# Patient Record
Sex: FEMALE | Race: WHITE | Employment: FULL TIME | URBAN - METROPOLITAN AREA
[De-identification: names, ages, dates, MRNs, and addresses within clinical notes are randomized per-mention and may not be internally consistent; named-entity substitution may affect disease eponyms.]

---

## 2018-02-07 ENCOUNTER — OFFICE VISIT (OUTPATIENT)
Dept: FAMILY MEDICINE CLINIC | Facility: CLINIC | Age: 28
End: 2018-02-07

## 2018-02-07 VITALS
WEIGHT: 148 LBS | RESPIRATION RATE: 20 BRPM | BODY MASS INDEX: 24.96 KG/M2 | TEMPERATURE: 98 F | DIASTOLIC BLOOD PRESSURE: 82 MMHG | SYSTOLIC BLOOD PRESSURE: 127 MMHG | HEART RATE: 96 BPM | HEIGHT: 64.5 IN

## 2018-02-07 DIAGNOSIS — G89.29 CHRONIC LOW BACK PAIN WITH LEFT-SIDED SCIATICA, UNSPECIFIED BACK PAIN LATERALITY: ICD-10-CM

## 2018-02-07 DIAGNOSIS — M54.42 CHRONIC LOW BACK PAIN WITH LEFT-SIDED SCIATICA, UNSPECIFIED BACK PAIN LATERALITY: ICD-10-CM

## 2018-02-07 DIAGNOSIS — K58.0 IRRITABLE BOWEL SYNDROME WITH DIARRHEA: ICD-10-CM

## 2018-02-07 PROCEDURE — 99202 OFFICE O/P NEW SF 15 MIN: CPT | Performed by: FAMILY MEDICINE

## 2018-02-07 PROCEDURE — 99212 OFFICE O/P EST SF 10 MIN: CPT | Performed by: FAMILY MEDICINE

## 2018-02-07 RX ORDER — CYCLOBENZAPRINE HCL 10 MG
10 TABLET ORAL NIGHTLY
Qty: 30 TABLET | Refills: 1 | Status: SHIPPED | OUTPATIENT
Start: 2018-02-07 | End: 2018-03-29

## 2018-02-07 RX ORDER — GABAPENTIN 100 MG/1
100 CAPSULE ORAL 3 TIMES DAILY
Qty: 90 CAPSULE | Refills: 2 | Status: SHIPPED | OUTPATIENT
Start: 2018-02-07 | End: 2018-04-17

## 2018-02-07 RX ORDER — DICYCLOMINE HCL 20 MG
20 TABLET ORAL
Qty: 30 TABLET | Refills: 2 | Status: SHIPPED | OUTPATIENT
Start: 2018-02-07 | End: 2018-05-02

## 2018-02-07 NOTE — PROGRESS NOTES
HPI:    Patient ID: Chantelle Pryor is a 28year old female. Pt presents to establish care and for some back pain and abdominal issues. Pt has hx of IBS and was on bentyl. Pt was incarcerated recently and just was released recently.  Pt is working as and exercises; Follow up in one week if not resolved. Consider follow up with physiatry if needed. No orders of the defined types were placed in this encounter.       Meds This Visit:  Signed Prescriptions Disp Refills    Dicyclomine HCl 20 MG Oral Tab

## 2018-02-08 ENCOUNTER — APPOINTMENT (OUTPATIENT)
Dept: LAB | Age: 28
End: 2018-02-08
Attending: FAMILY MEDICINE
Payer: MEDICAID

## 2018-02-08 ENCOUNTER — OFFICE VISIT (OUTPATIENT)
Dept: FAMILY MEDICINE CLINIC | Facility: CLINIC | Age: 28
End: 2018-02-08

## 2018-02-08 VITALS
BODY MASS INDEX: 24.96 KG/M2 | TEMPERATURE: 98 F | HEART RATE: 89 BPM | HEIGHT: 64.5 IN | RESPIRATION RATE: 20 BRPM | SYSTOLIC BLOOD PRESSURE: 116 MMHG | WEIGHT: 148 LBS | DIASTOLIC BLOOD PRESSURE: 73 MMHG

## 2018-02-08 DIAGNOSIS — Z11.3 ROUTINE SCREENING FOR STI (SEXUALLY TRANSMITTED INFECTION): ICD-10-CM

## 2018-02-08 DIAGNOSIS — R30.0 BURNING WITH URINATION: Primary | ICD-10-CM

## 2018-02-08 DIAGNOSIS — Z30.9 ENCOUNTER FOR CONTRACEPTIVE MANAGEMENT, UNSPECIFIED TYPE: ICD-10-CM

## 2018-02-08 LAB
BILIRUBIN URINE: NEGATIVE
BLOOD URINE: NEGATIVE
CONTROL RUN WITHIN 24 HOURS?: YES
GLUCOSE URINE: NEGATIVE
KETONE URINE: NEGATIVE
LEUKOCYTE ESTERASE URINE: NEGATIVE
NITRITE URINE: NEGATIVE
PH URINE: 6 (ref 5–8)
PROTEIN URINE: NEGATIVE
SPEC GRAVITY: 1 (ref 1–1.03)
URINE CLARITY: CLEAR
URINE COLOR: COLORLESS
UROBILINOGEN URINE: 0.2

## 2018-02-08 PROCEDURE — 36415 COLL VENOUS BLD VENIPUNCTURE: CPT

## 2018-02-08 PROCEDURE — 80074 ACUTE HEPATITIS PANEL: CPT

## 2018-02-08 PROCEDURE — 99213 OFFICE O/P EST LOW 20 MIN: CPT | Performed by: FAMILY MEDICINE

## 2018-02-08 PROCEDURE — 99212 OFFICE O/P EST SF 10 MIN: CPT | Performed by: FAMILY MEDICINE

## 2018-02-08 RX ORDER — NORGESTIMATE AND ETHINYL ESTRADIOL 7DAYSX3 LO
1 KIT ORAL DAILY
Qty: 1 PACKAGE | Refills: 2 | Status: SHIPPED | OUTPATIENT
Start: 2018-02-08 | End: 2018-05-02

## 2018-02-08 NOTE — PROGRESS NOTES
HPI:    Patient ID: Troy Flores is a 28year old female. Pt has had UTI symptoms for a 1 day. Burning with urination. No fevers or flank pain/ back pains. Pt is also here to discuss contraception options.  Pt was on depo provera previously and w up with gynecology for discussion on alternative contraception and routine exam.    Routine screening for sti (sexually transmitted infection):  - Will check hepatitis panel as discussed.  Follow up and further management after testing        Orders Placed

## 2018-02-09 LAB
HAV IGM SERPL QL IA: NONREACTIVE
HBV CORE IGM SERPL QL IA: NONREACTIVE
HBV SURFACE AG SERPL QL IA: NONREACTIVE
HCV AB SERPL QL IA: NONREACTIVE

## 2018-03-02 ENCOUNTER — HOSPITAL ENCOUNTER (EMERGENCY)
Facility: HOSPITAL | Age: 28
Discharge: HOME | End: 2018-03-02
Payer: MEDICAID

## 2018-03-02 VITALS
HEART RATE: 81 BPM | WEIGHT: 140 LBS | SYSTOLIC BLOOD PRESSURE: 124 MMHG | BODY MASS INDEX: 23.9 KG/M2 | OXYGEN SATURATION: 99 % | RESPIRATION RATE: 16 BRPM | TEMPERATURE: 99 F | HEIGHT: 64 IN | DIASTOLIC BLOOD PRESSURE: 72 MMHG

## 2018-03-02 DIAGNOSIS — R19.7 NAUSEA VOMITING AND DIARRHEA: Primary | ICD-10-CM

## 2018-03-02 DIAGNOSIS — R11.2 NAUSEA VOMITING AND DIARRHEA: Primary | ICD-10-CM

## 2018-03-02 LAB
ALBUMIN SERPL BCP-MCNC: 3.9 G/DL (ref 3.5–4.8)
ALP SERPL-CCNC: 25 U/L (ref 32–100)
ALT SERPL-CCNC: 29 U/L (ref 14–54)
ANION GAP SERPL CALC-SCNC: 12 MMOL/L (ref 0–18)
AST SERPL-CCNC: 28 U/L (ref 15–41)
B-HCG UR QL: NEGATIVE
BASOPHILS # BLD: 0 K/UL (ref 0–0.2)
BASOPHILS NFR BLD: 0 %
BILIRUB DIRECT SERPL-MCNC: 0 MG/DL (ref 0–0.2)
BILIRUB SERPL-MCNC: 0.4 MG/DL (ref 0.3–1.2)
BILIRUB UR QL: NEGATIVE
BUN SERPL-MCNC: 4 MG/DL (ref 8–20)
BUN/CREAT SERPL: 5.5 (ref 10–20)
CALCIUM SERPL-MCNC: 8.6 MG/DL (ref 8.5–10.5)
CHLORIDE SERPL-SCNC: 102 MMOL/L (ref 95–110)
CLARITY UR: CLEAR
CO2 SERPL-SCNC: 24 MMOL/L (ref 22–32)
COLOR UR: YELLOW
CREAT SERPL-MCNC: 0.73 MG/DL (ref 0.5–1.5)
EOSINOPHIL # BLD: 0.1 K/UL (ref 0–0.7)
EOSINOPHIL NFR BLD: 2 %
ERYTHROCYTE [DISTWIDTH] IN BLOOD BY AUTOMATED COUNT: 13.5 % (ref 11–15)
GLUCOSE SERPL-MCNC: 92 MG/DL (ref 70–99)
GLUCOSE UR-MCNC: NEGATIVE MG/DL
HCT VFR BLD AUTO: 39 % (ref 35–48)
HGB BLD-MCNC: 13.3 G/DL (ref 12–16)
HGB UR QL STRIP.AUTO: NEGATIVE
KETONES UR-MCNC: NEGATIVE MG/DL
LEUKOCYTE ESTERASE UR QL STRIP.AUTO: NEGATIVE
LIPASE SERPL-CCNC: 21 U/L (ref 22–51)
LYMPHOCYTES # BLD: 1.2 K/UL (ref 1–4)
LYMPHOCYTES NFR BLD: 26 %
MCH RBC QN AUTO: 31.2 PG (ref 27–32)
MCHC RBC AUTO-ENTMCNC: 34.2 G/DL (ref 32–37)
MCV RBC AUTO: 91.3 FL (ref 80–100)
MONOCYTES # BLD: 0.5 K/UL (ref 0–1)
MONOCYTES NFR BLD: 11 %
NEUTROPHILS # BLD AUTO: 2.9 K/UL (ref 1.8–7.7)
NEUTROPHILS NFR BLD: 62 %
NITRITE UR QL STRIP.AUTO: NEGATIVE
OSMOLALITY UR CALC.SUM OF ELEC: 283 MOSM/KG (ref 275–295)
PH UR: 7 [PH] (ref 5–8)
PLATELET # BLD AUTO: 288 K/UL (ref 140–400)
PMV BLD AUTO: 7.7 FL (ref 7.4–10.3)
POTASSIUM SERPL-SCNC: 3.6 MMOL/L (ref 3.3–5.1)
PROT SERPL-MCNC: 6.9 G/DL (ref 5.9–8.4)
PROT UR-MCNC: NEGATIVE MG/DL
RBC # BLD AUTO: 4.28 M/UL (ref 3.7–5.4)
SODIUM SERPL-SCNC: 138 MMOL/L (ref 136–144)
SP GR UR STRIP: 1 (ref 1–1.03)
UROBILINOGEN UR STRIP-ACNC: <2
VIT C UR-MCNC: NEGATIVE MG/DL
WBC # BLD AUTO: 4.7 K/UL (ref 4–11)

## 2018-03-02 PROCEDURE — 80076 HEPATIC FUNCTION PANEL: CPT | Performed by: NURSE PRACTITIONER

## 2018-03-02 PROCEDURE — 80048 BASIC METABOLIC PNL TOTAL CA: CPT | Performed by: NURSE PRACTITIONER

## 2018-03-02 PROCEDURE — 85025 COMPLETE CBC W/AUTO DIFF WBC: CPT | Performed by: NURSE PRACTITIONER

## 2018-03-02 PROCEDURE — 83690 ASSAY OF LIPASE: CPT | Performed by: NURSE PRACTITIONER

## 2018-03-02 PROCEDURE — 81025 URINE PREGNANCY TEST: CPT

## 2018-03-02 PROCEDURE — 99284 EMERGENCY DEPT VISIT MOD MDM: CPT

## 2018-03-02 PROCEDURE — 81003 URINALYSIS AUTO W/O SCOPE: CPT | Performed by: NURSE PRACTITIONER

## 2018-03-02 PROCEDURE — 96374 THER/PROPH/DIAG INJ IV PUSH: CPT

## 2018-03-02 PROCEDURE — 96361 HYDRATE IV INFUSION ADD-ON: CPT

## 2018-03-02 RX ORDER — ONDANSETRON 4 MG/1
4 TABLET, ORALLY DISINTEGRATING ORAL EVERY 4 HOURS PRN
Qty: 10 TABLET | Refills: 0 | Status: SHIPPED | OUTPATIENT
Start: 2018-03-02 | End: 2018-03-09

## 2018-03-02 RX ORDER — IBUPROFEN 600 MG/1
600 TABLET ORAL ONCE
Status: COMPLETED | OUTPATIENT
Start: 2018-03-02 | End: 2018-03-02

## 2018-03-02 RX ORDER — ONDANSETRON 2 MG/ML
4 INJECTION INTRAMUSCULAR; INTRAVENOUS ONCE
Status: COMPLETED | OUTPATIENT
Start: 2018-03-02 | End: 2018-03-02

## 2018-03-03 ENCOUNTER — TELEPHONE (OUTPATIENT)
Dept: FAMILY MEDICINE CLINIC | Facility: CLINIC | Age: 28
End: 2018-03-03

## 2018-03-03 NOTE — ED PROVIDER NOTES
Patient Seen in: [facility] AND [facility] Emergency Department    History   Patient presents with:  Nausea/Vomiting/Diarrhea (gastrointestinal)    Stated Complaint: vomiting, diarrhea    HPI    50-year-old female presents to the emergency department complaining respiratory distress. She has no wheezes. Abdominal: Soft. She exhibits no distension. There is no tenderness. Musculoskeletal: She exhibits no tenderness. Neurological: She is alert and oriented to person, place, and time.    Skin: Skin is warm and d diagnosis)    Disposition:  There is no disposition on file for this visit. There is no disposition time on file for this visit.     Follow-up:  Charla Ho MD  [facility] [address] [facility] [address] [id number] [phone]    Schedule an appointment as soon

## 2018-03-03 NOTE — ED INITIAL ASSESSMENT (HPI)
Pt states she has had countless episodes of vomiting and diarrhea since 3 days ago. She doesn't recall eating anything different from what she is used to. She had a temperature of 100F that was temporarily relieved by Teraflu.   Pt states no one else is s

## 2018-03-04 NOTE — TELEPHONE ENCOUNTER
On call:  Patient called with complains of vomiting and diarrhea and also some sweating. She states she has some cheeseburgers 3 days ago with her friends and those who had cheeseburgers all have similar symptoms.   She states her friend who ate other food

## 2018-03-27 ENCOUNTER — OFFICE VISIT (OUTPATIENT)
Dept: FAMILY MEDICINE CLINIC | Facility: CLINIC | Age: 28
End: 2018-03-27

## 2018-03-27 VITALS
TEMPERATURE: 99 F | WEIGHT: 139 LBS | BODY MASS INDEX: 24 KG/M2 | SYSTOLIC BLOOD PRESSURE: 121 MMHG | DIASTOLIC BLOOD PRESSURE: 74 MMHG | HEART RATE: 98 BPM

## 2018-03-27 DIAGNOSIS — R30.0 BURNING WITH URINATION: ICD-10-CM

## 2018-03-27 DIAGNOSIS — N39.0 URINARY TRACT INFECTION WITHOUT HEMATURIA, SITE UNSPECIFIED: Primary | ICD-10-CM

## 2018-03-27 LAB
APPEARANCE: CLEAR
MULTISTIX LOT#: NORMAL NUMERIC
NITRITE, URINE: POSITIVE
PH, URINE: 7 (ref 4.5–8)
SPECIFIC GRAVITY: 1 (ref 1–1.03)
UROBILINOGEN,SEMI-QN: 0.2 MG/DL (ref 0–1.9)

## 2018-03-27 PROCEDURE — 99213 OFFICE O/P EST LOW 20 MIN: CPT | Performed by: PHYSICIAN ASSISTANT

## 2018-03-27 PROCEDURE — 81002 URINALYSIS NONAUTO W/O SCOPE: CPT | Performed by: PHYSICIAN ASSISTANT

## 2018-03-27 PROCEDURE — 99212 OFFICE O/P EST SF 10 MIN: CPT | Performed by: PHYSICIAN ASSISTANT

## 2018-03-27 RX ORDER — LEVOFLOXACIN 500 MG/1
500 TABLET, FILM COATED ORAL DAILY
Qty: 10 TABLET | Refills: 0 | Status: SHIPPED | OUTPATIENT
Start: 2018-03-27 | End: 2018-04-03

## 2018-03-27 NOTE — PROGRESS NOTES
HPI:    Patient ID: Funmi Holloway is a 33year old female. Patient presents for burning pain with urination and urinary frequency for past week that has worsened in past three days.   She denies hematuria, fever, chills, flank pain, nausea or vomiting Soft. Bowel sounds are normal. She exhibits no distension. There is no tenderness. There is no CVA tenderness. Neurological: She is alert and oriented to person, place, and time. No cranial nerve deficit. Skin: Skin is warm and dry.    Psychiatric: She

## 2018-03-28 LAB
C TRACH DNA SPEC QL NAA+PROBE: NEGATIVE
N GONORRHOEA DNA SPEC QL NAA+PROBE: NEGATIVE

## 2018-03-29 RX ORDER — CYCLOBENZAPRINE HCL 10 MG
TABLET ORAL
Qty: 30 TABLET | Refills: 2 | Status: SHIPPED | OUTPATIENT
Start: 2018-03-29 | End: 2018-08-14

## 2018-03-29 NOTE — TELEPHONE ENCOUNTER
Please advise.    Last refilled on 2/7/18   #30  1 refill    Refill Protocol Appointment Criteria  · Appointment scheduled in the past 6 months or in the next 3 months  Recent Outpatient Visits            2 days ago Urinary tract infection without hematuria

## 2018-04-10 RX ORDER — GABAPENTIN 100 MG/1
CAPSULE ORAL
Qty: 90 CAPSULE | Refills: 1 | OUTPATIENT
Start: 2018-04-10

## 2018-04-12 ENCOUNTER — TELEPHONE (OUTPATIENT)
Dept: OTHER | Age: 28
End: 2018-04-12

## 2018-04-12 RX ORDER — GABAPENTIN 100 MG/1
CAPSULE ORAL
Qty: 90 CAPSULE | Refills: 1 | OUTPATIENT
Start: 2018-04-12

## 2018-04-12 NOTE — TELEPHONE ENCOUNTER
Patient calling and is asking for an appointment for the Gabapentin is not working. She still is having 7/10 pain to her back. Denies any change in bowel or bladder and can walk only with a limb. Appointment made for 4/16/18.

## 2018-04-16 ENCOUNTER — TELEPHONE (OUTPATIENT)
Dept: OTHER | Age: 28
End: 2018-04-16

## 2018-04-17 ENCOUNTER — OFFICE VISIT (OUTPATIENT)
Dept: FAMILY MEDICINE CLINIC | Facility: CLINIC | Age: 28
End: 2018-04-17

## 2018-04-17 VITALS
TEMPERATURE: 98 F | DIASTOLIC BLOOD PRESSURE: 78 MMHG | BODY MASS INDEX: 24.41 KG/M2 | HEART RATE: 112 BPM | WEIGHT: 143 LBS | SYSTOLIC BLOOD PRESSURE: 118 MMHG | RESPIRATION RATE: 20 BRPM | HEIGHT: 64 IN

## 2018-04-17 DIAGNOSIS — M54.50 DORSALGIA OF LUMBAR REGION: ICD-10-CM

## 2018-04-17 PROCEDURE — 99213 OFFICE O/P EST LOW 20 MIN: CPT | Performed by: FAMILY MEDICINE

## 2018-04-17 PROCEDURE — 99212 OFFICE O/P EST SF 10 MIN: CPT | Performed by: FAMILY MEDICINE

## 2018-04-17 RX ORDER — GABAPENTIN 300 MG/1
300 CAPSULE ORAL 3 TIMES DAILY
Qty: 90 CAPSULE | Refills: 0 | Status: SHIPPED | OUTPATIENT
Start: 2018-04-17 | End: 2018-05-02

## 2018-04-17 NOTE — PROGRESS NOTES
HPI:    Patient ID: Sourav Silva is a 33year old female. Patient is here for follow up for chronic medical issues- back pains. The patient is compliant with medications and no side effects.  There are no acute issues and patient is requesting refill Imaging & Referrals:  None       FY#[id number]

## 2018-05-03 RX ORDER — DICYCLOMINE HCL 20 MG
TABLET ORAL
Qty: 30 TABLET | Refills: 1 | Status: SHIPPED | OUTPATIENT
Start: 2018-05-03 | End: 2018-07-13

## 2018-05-03 RX ORDER — GABAPENTIN 300 MG/1
CAPSULE ORAL
Qty: 90 CAPSULE | Refills: 0 | Status: SHIPPED | OUTPATIENT
Start: 2018-05-03 | End: 2018-06-15

## 2018-05-03 RX ORDER — NORGESTIMATE AND ETHINYL ESTRADIOL
KIT
Qty: 28 TABLET | Refills: 1 | Status: SHIPPED | OUTPATIENT
Start: 2018-05-03 | End: 2018-07-01

## 2018-05-03 NOTE — TELEPHONE ENCOUNTER
Message noted: Chart reviewed and may refill medications as requested. . Prescription sent to listed pharmacy. Pharmacy to notify patient.

## 2018-05-17 ENCOUNTER — NURSE TRIAGE (OUTPATIENT)
Dept: OTHER | Age: 28
End: 2018-05-17

## 2018-05-17 ENCOUNTER — OFFICE VISIT (OUTPATIENT)
Dept: INTERNAL MEDICINE CLINIC | Facility: CLINIC | Age: 28
End: 2018-05-17

## 2018-05-17 VITALS
HEART RATE: 99 BPM | SYSTOLIC BLOOD PRESSURE: 115 MMHG | DIASTOLIC BLOOD PRESSURE: 77 MMHG | WEIGHT: 138 LBS | BODY MASS INDEX: 23.56 KG/M2 | HEIGHT: 64 IN

## 2018-05-17 DIAGNOSIS — N39.0 FREQUENT URINARY TRACT INFECTIONS: ICD-10-CM

## 2018-05-17 DIAGNOSIS — R30.0 DYSURIA: Primary | ICD-10-CM

## 2018-05-17 PROCEDURE — 99212 OFFICE O/P EST SF 10 MIN: CPT | Performed by: INTERNAL MEDICINE

## 2018-05-17 PROCEDURE — 81002 URINALYSIS NONAUTO W/O SCOPE: CPT | Performed by: INTERNAL MEDICINE

## 2018-05-17 PROCEDURE — 99213 OFFICE O/P EST LOW 20 MIN: CPT | Performed by: INTERNAL MEDICINE

## 2018-05-17 NOTE — PATIENT INSTRUCTIONS
1.  Take Bactrim for 4 more days, twice a day for total of 8 tablets. 2.  Make an appointment with the urologist.  3.  Drink plenty of fluids so that your urine is a light yellow to clear color.   Dysuria     Painful urination (dysuria) is often caused by following:  · Fever of 100.4°F (38°C) or higher   · No improvement after three days of treatment  · Trouble urinating because of pain  · New or increased discharge from the vagina or penis  · Rash or joint pain  · Increased back or abdominal pain  · Enlarg

## 2018-05-17 NOTE — TELEPHONE ENCOUNTER
Action Requested: Summary for Provider     []  Critical Lab, Recommendations Needed  [] Need Additional Advice  [x]   FYI    []   Need Orders  [] Need Medications Sent to Pharmacy  []  Other     SUMMARY: Patient calling with complaint of urinary burning, f

## 2018-05-17 NOTE — PROGRESS NOTES
Patient ID: Maura Emmanuel is a 33year old female.   Patient presents with:  Abdominal Pain: lower  Burning On Urination: hx of uti       HISTORY OF PRESENT ILLNESS:   HPI  Pt presents with dyuria  Onset - 2 day(s) ago  Pain - moderate  Fevers - No, chi Current User    Alcohol use No    Drug use: No    Sexual activity: Not on file     Other Topics Concern   None on file     Social History Narrative   None on file           PHYSICAL EXAM:      05/17/18  1610   BP: 115/77   Pulse: 99   Weight: 138 lb (62.6

## 2018-05-24 ENCOUNTER — APPOINTMENT (OUTPATIENT)
Dept: GENERAL RADIOLOGY | Facility: HOSPITAL | Age: 28
End: 2018-05-24
Attending: NURSE PRACTITIONER
Payer: MEDICAID

## 2018-05-24 ENCOUNTER — HOSPITAL ENCOUNTER (EMERGENCY)
Facility: HOSPITAL | Age: 28
Discharge: HOME | End: 2018-05-24
Payer: MEDICAID

## 2018-05-24 VITALS
HEART RATE: 75 BPM | TEMPERATURE: 98 F | BODY MASS INDEX: 23.9 KG/M2 | RESPIRATION RATE: 18 BRPM | WEIGHT: 140 LBS | DIASTOLIC BLOOD PRESSURE: 71 MMHG | OXYGEN SATURATION: 98 % | HEIGHT: 64 IN | SYSTOLIC BLOOD PRESSURE: 118 MMHG

## 2018-05-24 DIAGNOSIS — J20.9 ACUTE BRONCHITIS, UNSPECIFIED ORGANISM: Primary | ICD-10-CM

## 2018-05-24 PROCEDURE — 80048 BASIC METABOLIC PNL TOTAL CA: CPT | Performed by: NURSE PRACTITIONER

## 2018-05-24 PROCEDURE — 85025 COMPLETE CBC W/AUTO DIFF WBC: CPT | Performed by: NURSE PRACTITIONER

## 2018-05-24 PROCEDURE — 71046 X-RAY EXAM CHEST 2 VIEWS: CPT | Performed by: NURSE PRACTITIONER

## 2018-05-24 PROCEDURE — 94640 AIRWAY INHALATION TREATMENT: CPT

## 2018-05-24 PROCEDURE — 81025 URINE PREGNANCY TEST: CPT

## 2018-05-24 PROCEDURE — 99284 EMERGENCY DEPT VISIT MOD MDM: CPT

## 2018-05-24 PROCEDURE — 96374 THER/PROPH/DIAG INJ IV PUSH: CPT

## 2018-05-24 PROCEDURE — 85379 FIBRIN DEGRADATION QUANT: CPT | Performed by: NURSE PRACTITIONER

## 2018-05-24 RX ORDER — IPRATROPIUM BROMIDE AND ALBUTEROL SULFATE 2.5; .5 MG/3ML; MG/3ML
3 SOLUTION RESPIRATORY (INHALATION) ONCE
Status: COMPLETED | OUTPATIENT
Start: 2018-05-24 | End: 2018-05-24

## 2018-05-24 RX ORDER — KETOROLAC TROMETHAMINE 30 MG/ML
30 INJECTION, SOLUTION INTRAMUSCULAR; INTRAVENOUS ONCE
Status: COMPLETED | OUTPATIENT
Start: 2018-05-24 | End: 2018-05-24

## 2018-05-24 RX ORDER — ALBUTEROL SULFATE 90 UG/1
2 AEROSOL, METERED RESPIRATORY (INHALATION) EVERY 4 HOURS PRN
Qty: 1 INHALER | Refills: 0 | Status: SHIPPED | OUTPATIENT
Start: 2018-05-24 | End: 2018-06-23

## 2018-05-24 RX ORDER — BENZONATATE 100 MG/1
100 CAPSULE ORAL 3 TIMES DAILY PRN
Qty: 15 CAPSULE | Refills: 0 | Status: SHIPPED | OUTPATIENT
Start: 2018-05-24 | End: 2018-06-20 | Stop reason: ALTCHOICE

## 2018-05-24 NOTE — ED NOTES
Patient has had a cough for the last two days. States this happens to her every year, and starts with sinus pain. Now has a productive cough with green sputum. Denies SOB at this time, but is SOB when she has a coughing fit.

## 2018-05-24 NOTE — ED PROVIDER NOTES
Patient Seen in: [facility] Emergency Department    History   Patient presents with:  Cough/URI    Stated Complaint: cough & diarrhea     Patient presents into the emergency room for evaluation of a cough.   Patient states the onset of the symptoms 1041]  Temp src: Temporal [05/24/18 1041]  SpO2: 100 % [05/24/18 1041]  O2 Device: None (Room air) [05/24/18 1123]    Current:/70   Pulse 75   Temp 98.2 °F (36.8 °C) (Temporal)   Resp 18   Ht 162.6 cm (5' 4\")   Wt 63.5 kg   LMP 05/02/2018   SpO2 98% Please view results for these tests on the individual orders.    CBC W/ DIFFERENTIAL       ED Course as of May 24 1333  ------------------------------------------------------------      MDM       During the course of stay in the emergency room: Patient - 0.2 K/UL     1:37 PM  Patient was notified of the results her labs, was instructed she has a viral syndrome. She was instructed on smoking cessation, accompanied with encouragement to follow-up with her primary care provider.         Disposition and Plan

## 2018-06-16 RX ORDER — GABAPENTIN 300 MG/1
CAPSULE ORAL
Qty: 90 CAPSULE | Refills: 0 | Status: SHIPPED | OUTPATIENT
Start: 2018-06-16 | End: 2018-07-13

## 2018-06-20 ENCOUNTER — OFFICE VISIT (OUTPATIENT)
Dept: FAMILY MEDICINE CLINIC | Facility: CLINIC | Age: 28
End: 2018-06-20

## 2018-06-20 VITALS
TEMPERATURE: 99 F | BODY MASS INDEX: 24 KG/M2 | SYSTOLIC BLOOD PRESSURE: 120 MMHG | DIASTOLIC BLOOD PRESSURE: 72 MMHG | WEIGHT: 139 LBS | HEART RATE: 96 BPM

## 2018-06-20 DIAGNOSIS — N76.0 ACUTE VAGINITIS: Primary | ICD-10-CM

## 2018-06-20 PROCEDURE — 99213 OFFICE O/P EST LOW 20 MIN: CPT | Performed by: PHYSICIAN ASSISTANT

## 2018-06-20 PROCEDURE — 99212 OFFICE O/P EST SF 10 MIN: CPT | Performed by: PHYSICIAN ASSISTANT

## 2018-06-20 RX ORDER — METRONIDAZOLE 7.5 MG/G
GEL VAGINAL
Qty: 1 TUBE | Refills: 0 | Status: SHIPPED | OUTPATIENT
Start: 2018-06-20 | End: 2018-08-14

## 2018-06-20 NOTE — PROGRESS NOTES
HPI:    Patient ID: Tanya Cruz is a 33year old female. Patient presents for vaginal discharge for past few days. Discharge is thicker and yellow in color. She has noticed odor. She has had mild burning and itching. No dysuria or pelvic pain. Vitals reviewed. ASSESSMENT/PLAN:   Acute vaginitis  (primary encounter diagnosis)  -Genital vaginosis screen and G/C screening sent to lab. -Metronidazole vaginal gel sent to pharmacy.  -Will follow up regarding lab results.       Orders Allie

## 2018-06-22 RX ORDER — FLUCONAZOLE 150 MG/1
150 TABLET ORAL ONCE
Qty: 1 TABLET | Refills: 0 | Status: SHIPPED | OUTPATIENT
Start: 2018-06-22 | End: 2018-06-22

## 2018-07-02 RX ORDER — NORGESTIMATE AND ETHINYL ESTRADIOL
KIT
Qty: 28 TABLET | Refills: 0 | Status: SHIPPED | OUTPATIENT
Start: 2018-07-02 | End: 2018-07-13

## 2018-07-02 NOTE — TELEPHONE ENCOUNTER
Message noted: Chart reviewed and may refill medication as requested times one. Prescription sent to listed pharmacy.  Please notify patient to make follow up appointment with her gynecologist for further refills

## 2018-07-13 RX ORDER — DICYCLOMINE HCL 20 MG
TABLET ORAL
Qty: 120 TABLET | Refills: 0 | Status: SHIPPED | OUTPATIENT
Start: 2018-07-13 | End: 2018-08-14

## 2018-07-13 RX ORDER — GABAPENTIN 300 MG/1
CAPSULE ORAL
Qty: 90 CAPSULE | Refills: 0 | Status: SHIPPED | OUTPATIENT
Start: 2018-07-13 | End: 2018-08-14

## 2018-07-14 RX ORDER — NORGESTIMATE AND ETHINYL ESTRADIOL
KIT
Qty: 28 TABLET | Refills: 0 | Status: SHIPPED | OUTPATIENT
Start: 2018-07-14 | End: 2018-08-22

## 2018-07-14 NOTE — TELEPHONE ENCOUNTER
Failed per nursing protocol - please advise on refill request       Gynecology Medications  Protocol Criteria:  · Appointment scheduled in the past 12 months or the next 3 months  · Pap smear in the past 12 months  · Pap smear WNL manually verified  Recen Keira Lundberg, [employer]    Office Visit    1 month ago Ade Rod Shayla Ravens, MD    Office Visit    2 months ago Dorsalgia of lumbar region    Mckenzie Servin MD

## 2018-07-14 NOTE — TELEPHONE ENCOUNTER
Message noted: Chart reviewed and may refill medication as requested times one. Prescription sent to listed pharmacy. Can notify pt to make follow up appt with her gynecologist for further refills.

## 2018-08-14 ENCOUNTER — OFFICE VISIT (OUTPATIENT)
Dept: FAMILY MEDICINE CLINIC | Facility: CLINIC | Age: 28
End: 2018-08-14
Payer: MEDICAID

## 2018-08-14 VITALS
SYSTOLIC BLOOD PRESSURE: 133 MMHG | WEIGHT: 136 LBS | TEMPERATURE: 98 F | BODY MASS INDEX: 21.86 KG/M2 | HEART RATE: 79 BPM | RESPIRATION RATE: 20 BRPM | HEIGHT: 66 IN | DIASTOLIC BLOOD PRESSURE: 77 MMHG

## 2018-08-14 DIAGNOSIS — M54.9 CHRONIC BACK PAIN, UNSPECIFIED BACK LOCATION, UNSPECIFIED BACK PAIN LATERALITY: Primary | ICD-10-CM

## 2018-08-14 DIAGNOSIS — G89.29 CHRONIC BACK PAIN, UNSPECIFIED BACK LOCATION, UNSPECIFIED BACK PAIN LATERALITY: Primary | ICD-10-CM

## 2018-08-14 PROCEDURE — 99212 OFFICE O/P EST SF 10 MIN: CPT | Performed by: FAMILY MEDICINE

## 2018-08-14 PROCEDURE — 99213 OFFICE O/P EST LOW 20 MIN: CPT | Performed by: FAMILY MEDICINE

## 2018-08-14 RX ORDER — TRAMADOL HYDROCHLORIDE 50 MG/1
50 TABLET ORAL EVERY 6 HOURS PRN
Qty: 45 TABLET | Refills: 0 | Status: SHIPPED | OUTPATIENT
Start: 2018-08-14 | End: 2018-09-04

## 2018-08-14 RX ORDER — GABAPENTIN 300 MG/1
CAPSULE ORAL
Qty: 90 CAPSULE | Refills: 0 | Status: SHIPPED | OUTPATIENT
Start: 2018-08-14 | End: 2019-03-20

## 2018-08-14 NOTE — PROGRESS NOTES
HPI:    Patient ID: Sajan Cagle is a 33year old female. Patient is here for follow up for chronic back pains. The patient is compliant with medications and no side effects. There are no acute issues and patient is requesting refills.  The patient s THREE TIMES DAILY AS NEEDED           Imaging & Referrals:  PHYSIATRY - INTERNAL       AM#[id number]

## 2018-08-23 RX ORDER — NORGESTIMATE AND ETHINYL ESTRADIOL
KIT
Qty: 28 TABLET | Refills: 5 | Status: SHIPPED | OUTPATIENT
Start: 2018-08-23 | End: 2019-03-20

## 2018-08-23 NOTE — TELEPHONE ENCOUNTER
TRILOMARZIA Protocol failed, please advise on prescription request  NO PAP ON FILE, PATIENT HAS NOT YET ESTABLISHED CARE WITH GYNE  Gynecology Medications  Protocol Criteria:  · Appointment scheduled in the past 12 months or the next 3 months  · Pap smear

## 2018-09-01 RX ORDER — TRAMADOL HYDROCHLORIDE 50 MG/1
TABLET ORAL
Qty: 45 TABLET | Refills: 0 | OUTPATIENT
Start: 2018-09-01

## 2018-09-01 NOTE — TELEPHONE ENCOUNTER
Per pt Dr. Bettencourt Po requested pt call with update on back pain. Taking tramadol.  Pt verbalized the Tramadol is making her nauseated and pt is requesting low dose of Norco. Please advise

## 2018-09-04 RX ORDER — TRAMADOL HYDROCHLORIDE 50 MG/1
50 TABLET ORAL EVERY 6 HOURS PRN
Qty: 45 TABLET | Refills: 0 | Status: SHIPPED | OUTPATIENT
Start: 2018-09-04 | End: 2019-03-20

## 2018-09-18 ENCOUNTER — OFFICE VISIT (OUTPATIENT)
Dept: NEUROLOGY | Facility: CLINIC | Age: 28
End: 2018-09-18
Payer: MEDICAID

## 2018-09-18 ENCOUNTER — HOSPITAL ENCOUNTER (OUTPATIENT)
Dept: GENERAL RADIOLOGY | Facility: HOSPITAL | Age: 28
Discharge: HOME | End: 2018-09-18
Attending: PHYSICAL MEDICINE & REHABILITATION
Payer: MEDICAID

## 2018-09-18 VITALS
RESPIRATION RATE: 16 BRPM | HEART RATE: 80 BPM | BODY MASS INDEX: 23.9 KG/M2 | DIASTOLIC BLOOD PRESSURE: 76 MMHG | HEIGHT: 64 IN | SYSTOLIC BLOOD PRESSURE: 110 MMHG | WEIGHT: 140 LBS

## 2018-09-18 DIAGNOSIS — G89.29 CHRONIC BILATERAL LOW BACK PAIN WITH LEFT-SIDED SCIATICA: ICD-10-CM

## 2018-09-18 DIAGNOSIS — M54.42 CHRONIC BILATERAL LOW BACK PAIN WITH LEFT-SIDED SCIATICA: ICD-10-CM

## 2018-09-18 DIAGNOSIS — G89.29 CHRONIC BILATERAL THORACIC BACK PAIN: ICD-10-CM

## 2018-09-18 DIAGNOSIS — M54.6 CHRONIC BILATERAL THORACIC BACK PAIN: ICD-10-CM

## 2018-09-18 DIAGNOSIS — M54.6 CHRONIC BILATERAL THORACIC BACK PAIN: Primary | ICD-10-CM

## 2018-09-18 DIAGNOSIS — G89.29 CHRONIC BILATERAL THORACIC BACK PAIN: Primary | ICD-10-CM

## 2018-09-18 PROCEDURE — 72110 X-RAY EXAM L-2 SPINE 4/>VWS: CPT | Performed by: PHYSICAL MEDICINE & REHABILITATION

## 2018-09-18 PROCEDURE — 99204 OFFICE O/P NEW MOD 45 MIN: CPT | Performed by: PHYSICAL MEDICINE & REHABILITATION

## 2018-09-18 PROCEDURE — 72072 X-RAY EXAM THORAC SPINE 3VWS: CPT | Performed by: PHYSICAL MEDICINE & REHABILITATION

## 2018-09-18 RX ORDER — BACLOFEN 10 MG/1
10 TABLET ORAL 2 TIMES DAILY PRN
Qty: 60 TABLET | Refills: 0 | Status: SHIPPED | OUTPATIENT
Start: 2018-09-18 | End: 2019-03-20

## 2018-09-18 RX ORDER — METHYLPREDNISOLONE 4 MG/1
TABLET ORAL
Qty: 1 PACKAGE | Refills: 0 | Status: SHIPPED | OUTPATIENT
Start: 2018-09-18 | End: 2019-03-20

## 2018-09-18 NOTE — PATIENT INSTRUCTIONS
1) Discontinue the gabapentin and the tramadol. 2) Instructions for discontinuing gabapentin:   1 cap twice a day for 1 week, then  1 cap once a day for 1 week, then discontinue.     3) start the medrol dosepak as prescribed; complete the pack unless yo

## 2018-09-18 NOTE — H&P
[facility] [address], [place] [address], [address], [facility]    History and Physical    Jarred Alaniz Patient Status:  No patient class for patient encounter    2/15/1990 MRN [id number]   Location [facility] [address], [place] [address] Anxiety    • PTSD (post-traumatic stress disorder)      Past Surgical History:  No date: APPENDECTOMY  No date: TONSILLECTOMY  History reviewed. No pertinent family history.   Social History:  Social History    Tobacco Use      Smoking status: Current Every elbow flexion, elbow extension, wrist extension, finger flexion, ADM bilaterally. 5/5 Hf, hip Abd, hip add, knee extension, ankle dorsiflexion, ankle plantarflexion bilaterally. Sensation: Decreased sensation in the left lateral leg and foot.  Normal u

## 2018-09-19 ENCOUNTER — TELEPHONE (OUTPATIENT)
Dept: NEUROLOGY | Facility: CLINIC | Age: 28
End: 2018-09-19

## 2018-09-19 NOTE — TELEPHONE ENCOUNTER
Left detailed message for pt with Dr. Lisandro Randle XR findings and direction to discuss re-evaluation at f/u visit if she is still in pain after PT and using medications. Pt was told to call office with any further questions.

## 2018-09-19 NOTE — TELEPHONE ENCOUNTER
----- Message from Gilles Montgomery DO sent at 9/19/2018 11:54 AM CDT -----  Please let the patient know that I've reviewed the Xrays of both the lower and mid-back and the xrays look normal. She should continue with the medications and physical therapy.  If

## 2018-09-19 NOTE — PROGRESS NOTES
Please let the patient know that I've reviewed the Xrays of both the lower and mid-back and the xrays look normal. She should continue with the medications and physical therapy.  If she is still having pain when I see her for follow up I will re-evaluate he

## 2018-11-27 ENCOUNTER — TELEPHONE (OUTPATIENT)
Dept: FAMILY MEDICINE CLINIC | Facility: CLINIC | Age: 28
End: 2018-11-27

## 2018-11-27 NOTE — TELEPHONE ENCOUNTER
Gerhard Anna would like to inform the doctor that the patient was admitted [facility] on 11-23-18. Diagnosis: Alcohol  dependence and opioid dependence.

## 2019-03-20 ENCOUNTER — OFFICE VISIT (OUTPATIENT)
Dept: FAMILY MEDICINE CLINIC | Facility: CLINIC | Age: 29
End: 2019-03-20
Payer: MEDICAID

## 2019-03-20 ENCOUNTER — TELEPHONE (OUTPATIENT)
Dept: OTHER | Age: 29
End: 2019-03-20

## 2019-03-20 VITALS
DIASTOLIC BLOOD PRESSURE: 69 MMHG | HEIGHT: 64 IN | HEART RATE: 71 BPM | WEIGHT: 141 LBS | RESPIRATION RATE: 18 BRPM | SYSTOLIC BLOOD PRESSURE: 110 MMHG | BODY MASS INDEX: 24.07 KG/M2 | TEMPERATURE: 98 F

## 2019-03-20 DIAGNOSIS — F41.9 ANXIETY: ICD-10-CM

## 2019-03-20 DIAGNOSIS — R30.9 PAINFUL URINATION: Primary | ICD-10-CM

## 2019-03-20 LAB
BILIRUBIN URINE: NEGATIVE
CONTROL LINE PRESENT WITH A CLEAR BACKGROUND (YES/NO): YES YES/NO
CONTROL RUN WITHIN 24 HOURS?: YES
GLUCOSE URINE: NEGATIVE
KETONE URINE: NEGATIVE
KIT EXPIRATION DATE: NORMAL DATE
KIT LOT #: NORMAL NUMERIC
NITRITE URINE: NEGATIVE
PH URINE: 5 (ref 5–8)
PREGNANCY TEST, URINE: NEGATIVE
PROTEIN URINE: NEGATIVE
SPEC GRAVITY: 1.03 (ref 1–1.03)
UROBILINOGEN URINE: 0.2

## 2019-03-20 PROCEDURE — 81025 URINE PREGNANCY TEST: CPT | Performed by: FAMILY MEDICINE

## 2019-03-20 PROCEDURE — 96372 THER/PROPH/DIAG INJ SC/IM: CPT | Performed by: FAMILY MEDICINE

## 2019-03-20 PROCEDURE — 99212 OFFICE O/P EST SF 10 MIN: CPT | Performed by: FAMILY MEDICINE

## 2019-03-20 PROCEDURE — 99214 OFFICE O/P EST MOD 30 MIN: CPT | Performed by: FAMILY MEDICINE

## 2019-03-20 RX ORDER — METRONIDAZOLE 500 MG/1
500 TABLET ORAL 2 TIMES DAILY
Qty: 14 TABLET | Refills: 0 | Status: SHIPPED | OUTPATIENT
Start: 2019-03-20 | End: 2019-07-03 | Stop reason: ALTCHOICE

## 2019-03-20 RX ORDER — CEFTRIAXONE 500 MG/1
500 INJECTION, POWDER, FOR SOLUTION INTRAMUSCULAR; INTRAVENOUS ONCE
Status: COMPLETED | OUTPATIENT
Start: 2019-03-20 | End: 2019-03-20

## 2019-03-20 RX ORDER — CLONAZEPAM 0.5 MG/1
0.5 TABLET ORAL 2 TIMES DAILY PRN
Qty: 60 TABLET | Refills: 0 | Status: SHIPPED | OUTPATIENT
Start: 2019-03-20 | End: 2019-09-13 | Stop reason: DRUGHIGH

## 2019-03-20 RX ORDER — NORGESTIMATE AND ETHINYL ESTRADIOL 7DAYSX3 LO
1 KIT ORAL
Qty: 28 TABLET | Refills: 5 | Status: SHIPPED | OUTPATIENT
Start: 2019-03-20 | End: 2019-07-03

## 2019-03-20 RX ORDER — AZITHROMYCIN 500 MG/1
1000 TABLET, FILM COATED ORAL ONCE
Qty: 2 TABLET | Refills: 0 | Status: SHIPPED | OUTPATIENT
Start: 2019-03-20 | End: 2019-03-20

## 2019-03-20 RX ADMIN — CEFTRIAXONE 500 MG: 500 INJECTION, POWDER, FOR SOLUTION INTRAMUSCULAR; INTRAVENOUS at 15:41:00

## 2019-03-20 NOTE — PROGRESS NOTES
HPI:    Patient ID: Crescencio Corona is a 26year old female. Patient presents today for UTI like symptoms for the past 1.5 weeks ago. She was previously diagnosed with trichomonas about 4 months ago.  She was told to avoid sexual contact for 2 weeks, ho cause sedation/ fatigue. Not to drive or operate heavy machinery. Disp: 60 tablet Rfl: 0   azithromycin (ZITHROMAX) 500 MG Oral Tab Take 2 tablets (1,000 mg total) by mouth once for 1 dose.  Disp: 2 tablet Rfl: 0   metRONIDAZOLE (FLAGYL) 500 MG Oral Tab Vijay Iglesias times daily as needed for Anxiety. May cause sedation/ fatigue. Not to drive or operate heavy machinery. • azithromycin (ZITHROMAX) 500 MG Oral Tab 2 tablet 0     Sig: Take 2 tablets (1,000 mg total) by mouth once for 1 dose.    • metRONIDAZOLE (FLAGYL) 5

## 2019-03-21 LAB
C TRACH DNA SPEC QL NAA+PROBE: NEGATIVE
N GONORRHOEA DNA SPEC QL NAA+PROBE: NEGATIVE

## 2019-03-25 ENCOUNTER — TELEPHONE (OUTPATIENT)
Dept: OTHER | Age: 29
End: 2019-03-25

## 2019-03-25 RX ORDER — FLUCONAZOLE 150 MG/1
150 TABLET ORAL DAILY
Qty: 2 TABLET | Refills: 0 | Status: SHIPPED | OUTPATIENT
Start: 2019-03-25 | End: 2019-07-03 | Stop reason: ALTCHOICE

## 2019-03-25 NOTE — TELEPHONE ENCOUNTER
Spoke with patient and instructed her on Dr. Sherry Payton orders and plan of care. Patient voiced understanding and agreed with the plan of care. She also reports she spoke with Dee Dee De La Cruz already but she is out of the patient's network.  But Raciel Bowen did prov

## 2019-03-25 NOTE — TELEPHONE ENCOUNTER
Message noted. May start diflucan as requested for yeast infection. Erx sent to listed pharmacy. To follow up for appointment if not better; Please notify patient. Lavinia Rinne should contact patient soon to arrange for behavioral health services.  Will

## 2019-03-25 NOTE — TELEPHONE ENCOUNTER
Dr Jailene Townsend: Pharmacy called and questioned instructions for diflucan. Qty 2    Rx dated 3/25/19. Pharmacist asked if she is to take 1 tab and another the next day, or is the patient to take one only and repeat later?

## 2019-03-25 NOTE — TELEPHONE ENCOUNTER
Pt states she was given antibiotics for UTI and possible STD. Pt states she now is having symptoms of a yeast infection. Thick, white vaginal discharge, area is painful with itching and burning, some swelling.  Pt has been treating with at [place] [address]

## 2019-03-25 NOTE — TELEPHONE ENCOUNTER
Spoke with tonny Cullen pharmacist. Clarification as stated below by Dr. Lianet Jeffers relayed. Pharmacist verbalized understanding.

## 2019-03-30 ENCOUNTER — HOSPITAL ENCOUNTER (EMERGENCY)
Facility: HOSPITAL | Age: 29
Discharge: HOME | End: 2019-03-30
Attending: EMERGENCY MEDICINE
Payer: MEDICAID

## 2019-03-30 VITALS
RESPIRATION RATE: 16 BRPM | SYSTOLIC BLOOD PRESSURE: 125 MMHG | DIASTOLIC BLOOD PRESSURE: 75 MMHG | HEIGHT: 64 IN | TEMPERATURE: 98 F | HEART RATE: 88 BPM | WEIGHT: 140 LBS | BODY MASS INDEX: 23.9 KG/M2 | OXYGEN SATURATION: 98 %

## 2019-03-30 DIAGNOSIS — N30.00 ACUTE CYSTITIS WITHOUT HEMATURIA: Primary | ICD-10-CM

## 2019-03-30 PROCEDURE — 87186 SC STD MICRODIL/AGAR DIL: CPT | Performed by: EMERGENCY MEDICINE

## 2019-03-30 PROCEDURE — 87077 CULTURE AEROBIC IDENTIFY: CPT | Performed by: EMERGENCY MEDICINE

## 2019-03-30 PROCEDURE — 99284 EMERGENCY DEPT VISIT MOD MDM: CPT

## 2019-03-30 PROCEDURE — 81025 URINE PREGNANCY TEST: CPT

## 2019-03-30 PROCEDURE — 87086 URINE CULTURE/COLONY COUNT: CPT | Performed by: EMERGENCY MEDICINE

## 2019-03-30 PROCEDURE — 81001 URINALYSIS AUTO W/SCOPE: CPT | Performed by: EMERGENCY MEDICINE

## 2019-03-30 RX ORDER — LEVOFLOXACIN 750 MG/1
750 TABLET ORAL DAILY
Qty: 9 TABLET | Refills: 0 | Status: SHIPPED | OUTPATIENT
Start: 2019-03-30 | End: 2019-04-08

## 2019-03-30 RX ORDER — LEVOFLOXACIN 750 MG/1
750 TABLET ORAL ONCE
Status: COMPLETED | OUTPATIENT
Start: 2019-03-30 | End: 2019-03-30

## 2019-03-30 RX ORDER — PHENAZOPYRIDINE HYDROCHLORIDE 100 MG/1
100 TABLET, FILM COATED ORAL 2 TIMES DAILY PRN
Qty: 6 TABLET | Refills: 0 | Status: SHIPPED | OUTPATIENT
Start: 2019-03-30 | End: 2019-04-02

## 2019-03-30 NOTE — ED PROVIDER NOTES
Patient Seen in: [facility] Emergency Department    History   Patient presents with:  Urinary Symptoms (urologic)    Stated Complaint: UTI     HPI    25 yo F with PMH anxiety/PTSD, recurrent with intermittently bactrim resistant UTI presneting for e HPI.    Physical Exam     ED Triage Vitals [03/30/19 1709]   /75   Pulse 88   Resp 16   Temp 97.5 °F (36.4 °C)   Temp src Oral   SpO2 98 %   O2 Device None (Room air)       Current:/75   Pulse 88   Temp 97.5 °F (36.4 °C) (Oral)   Resp 16   Ht 1 MD Mandi Ramírez 49  [phone]    Call  For OB/gyne followup and re-evaluation.       Medications Prescribed:  Discharge Medication List as of 3/30/2019  5:58 PM    START taking these medications    levofloxacin 750 MG Oral Tab  Ta

## 2019-03-30 NOTE — ED INITIAL ASSESSMENT (HPI)
Patient was seen at md office for uti and was prescribed with bactrim. Per patient the antibiotics is not working.

## 2019-07-03 PROBLEM — F33.1 MODERATE EPISODE OF RECURRENT MAJOR DEPRESSIVE DISORDER (HCC): Status: ACTIVE | Noted: 2019-07-03

## 2019-07-03 PROBLEM — F41.9 ANXIETY: Status: ACTIVE | Noted: 2019-07-03

## 2019-07-03 NOTE — PATIENT INSTRUCTIONS
Depression  Depression is one of the most common mental health problems today. It is not just a state of unhappiness or sadness. It is a true disease. The cause seems to be related to a decrease in chemicals that transmit signals in the brain.  Having a f · Don't drink alcohol, which can make depression worse. · Take medicine as prescribed.   · Tell each of your healthcare providers about all of the prescription and over-the-counter medicines, vitamins, and supplements you take. Certain supplements interact Almost everyone gets nervous now and then. It’s normal to have knots in your stomach before a test, or for your heart to race on a first date. But an anxiety disorder is much more than a case of nerves. In fact, its symptoms may be overwhelming.  But treatm You may believe that nothing can help you. Or, you might fear what others may think. But most anxiety symptoms can be eased. Having an anxiety disorder is nothing to be ashamed of. Most people do best with treatment that combines medicine and therapy.  Thes

## 2019-07-03 NOTE — PROGRESS NOTES
Patient ID: Crescencio Corona is a 26year old female. Patient presents with:  Establish Care: Was an established patient whom had trasferred care elsewhere and is now re-establishing care. Medication Request: Psych medications.  Has been without meds fo (post-traumatic stress disorder)        Past Surgical History:   Procedure Laterality Date   • APPENDECTOMY     • TONSILLECTOMY           Current Outpatient Medications:   •  Norgestim-Eth Estrad Triphasic (TRI-LO-PABLO) 0.18/0.215/0.25 MG-25 MCG Oral Tab file        Relationship status: Not on file      Intimate partner violence:        Fear of current or ex partner: Not on file        Emotionally abused: Not on file        Physically abused: Not on file        Forced sexual activity: Not on file    Other 0  · Follow-up with psychiatry. Return in about 1 month (around 7/31/2019).     Dre Betancourt MD  7/3/2019

## 2019-07-12 ENCOUNTER — TELEPHONE (OUTPATIENT)
Dept: INTERNAL MEDICINE CLINIC | Facility: CLINIC | Age: 29
End: 2019-07-12

## 2019-07-12 ENCOUNTER — MED REC SCAN ONLY (OUTPATIENT)
Dept: INTERNAL MEDICINE CLINIC | Facility: CLINIC | Age: 29
End: 2019-07-12

## 2019-07-12 NOTE — TELEPHONE ENCOUNTER
Did you receive the MRI done at [facility]. The results were to be faxed to you?

## 2019-07-12 NOTE — TELEPHONE ENCOUNTER
Pt states Had MRI at [address] [facility] last Sunday and was told it will be sent to Dr Jaqueline Allen. Pt is asking if the MRI has been received by Dr Jaqueline Allen, pt would like to discuss the test result. Informed pt , no record in the system.    Pt will contact Chloe Paredes

## 2019-07-24 ENCOUNTER — TELEPHONE (OUTPATIENT)
Dept: INTERNAL MEDICINE CLINIC | Facility: CLINIC | Age: 29
End: 2019-07-24

## 2019-07-24 NOTE — TELEPHONE ENCOUNTER
Pt req MRI results that was done at [facility], please advise.     See Communication from 7/12

## 2019-07-24 NOTE — TELEPHONE ENCOUNTER
The ordering physician is the one who she discussed with her the results.   Based off of the reading however everything is normal.

## 2019-07-27 NOTE — TELEPHONE ENCOUNTER
Patient calling back. Advised patient of Dr. Selvin Morrison note below. Patient verbalized understanding.  Patient stating her previous MD ordered the MRI because of abnormal blood test results and patient asking what next steps would be as MRI was normal? Advised

## 2019-08-07 ENCOUNTER — TELEPHONE (OUTPATIENT)
Dept: INTERNAL MEDICINE CLINIC | Facility: CLINIC | Age: 29
End: 2019-08-07
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
## 2019-08-12 NOTE — PROGRESS NOTES
Patient ID: Massiel Wakefield is a 26year old female. Patient presents with:  Medication Follow-Up: follow up for sphyc meds   Fall: fell this weekend having back pain      Patient arrived over 20 minutes late for her appointment.   HISTORY OF PRESENT ILL Take 1 tablet (0.5 mg total) by mouth 2 (two) times daily as needed for Anxiety. May cause sedation/ fatigue. Not to drive or operate heavy machinery. , Disp: 60 tablet, Rfl: 0    Allergies:No Known Allergies    Social History    Socioeconomic History Special Diet: Not Asked        Back Care: Not Asked        Exercise: Yes        Bike Helmet: Not Asked        Seat Belt: Not Asked        Self-Exams: Not Asked    Social History Narrative      The patient does not use an assistive device. .        The patie

## 2019-08-12 NOTE — PATIENT INSTRUCTIONS
Lumbar Extension (Flexibility)    1. Lie face down on your stomach, forehead on the floor. You can lie on a mat or towel. 2. Bend your arms next to your body and lift your upper body up on your forearms.  Your palms and forearms should be flat on the rogelio

## 2019-09-13 ENCOUNTER — OFFICE VISIT (OUTPATIENT)
Dept: INTERNAL MEDICINE CLINIC | Facility: CLINIC | Age: 29
End: 2019-09-13
Payer: MEDICAID

## 2019-09-13 ENCOUNTER — LAB ENCOUNTER (OUTPATIENT)
Dept: LAB | Age: 29
End: 2019-09-13
Attending: INTERNAL MEDICINE
Payer: MEDICAID

## 2019-09-13 ENCOUNTER — TELEPHONE (OUTPATIENT)
Dept: OBGYN CLINIC | Facility: CLINIC | Age: 29
End: 2019-09-13

## 2019-09-13 VITALS
BODY MASS INDEX: 25.61 KG/M2 | WEIGHT: 150 LBS | SYSTOLIC BLOOD PRESSURE: 108 MMHG | TEMPERATURE: 99 F | DIASTOLIC BLOOD PRESSURE: 65 MMHG | HEART RATE: 63 BPM | HEIGHT: 64 IN

## 2019-09-13 DIAGNOSIS — N92.6 MISSED PERIODS: Primary | ICD-10-CM

## 2019-09-13 DIAGNOSIS — N92.6 MISSED PERIODS: ICD-10-CM

## 2019-09-13 DIAGNOSIS — Z72.51 HIGH RISK HETEROSEXUAL BEHAVIOR: ICD-10-CM

## 2019-09-13 DIAGNOSIS — F17.210 CIGARETTE NICOTINE DEPENDENCE WITHOUT COMPLICATION: ICD-10-CM

## 2019-09-13 LAB
B-HCG SERPL-ACNC: ABNORMAL MIU/ML
CONTROL LINE PRESENT WITH A CLEAR BACKGROUND (YES/NO): YES YES/NO
HBV SURFACE AG SER-ACNC: <0.1 [IU]/L
HBV SURFACE AG SERPL QL IA: NONREACTIVE
HCV AB SERPL QL IA: NONREACTIVE
KIT LOT #: NORMAL NUMERIC
PREGNANCY TEST, URINE: POSITIVE

## 2019-09-13 PROCEDURE — 87491 CHLMYD TRACH DNA AMP PROBE: CPT

## 2019-09-13 PROCEDURE — 86803 HEPATITIS C AB TEST: CPT

## 2019-09-13 PROCEDURE — 87389 HIV-1 AG W/HIV-1&-2 AB AG IA: CPT

## 2019-09-13 PROCEDURE — 84702 CHORIONIC GONADOTROPIN TEST: CPT

## 2019-09-13 PROCEDURE — 86780 TREPONEMA PALLIDUM: CPT

## 2019-09-13 PROCEDURE — 81025 URINE PREGNANCY TEST: CPT | Performed by: INTERNAL MEDICINE

## 2019-09-13 PROCEDURE — 87340 HEPATITIS B SURFACE AG IA: CPT

## 2019-09-13 PROCEDURE — 36415 COLL VENOUS BLD VENIPUNCTURE: CPT

## 2019-09-13 PROCEDURE — 87591 N.GONORRHOEAE DNA AMP PROB: CPT

## 2019-09-13 PROCEDURE — 99214 OFFICE O/P EST MOD 30 MIN: CPT | Performed by: INTERNAL MEDICINE

## 2019-09-13 RX ORDER — CLONAZEPAM 1 MG/1
1 TABLET ORAL
Refills: 2 | COMMUNITY
Start: 2019-09-04

## 2019-09-13 RX ORDER — ESCITALOPRAM OXALATE 10 MG/1
10 TABLET ORAL DAILY
Refills: 2 | COMMUNITY
Start: 2019-09-04

## 2019-09-13 RX ORDER — QUETIAPINE 50 MG/1
50 TABLET, FILM COATED ORAL 2 TIMES DAILY
Refills: 1 | COMMUNITY
Start: 2019-09-04

## 2019-09-13 NOTE — PROGRESS NOTES
Patient ID: Abilio Brandon is a 26year old female. Patient presents with:  STD: STD screen and pregnancy test. Letter of verification of pregnancy. Declined flu vaccine.   Referral: for OBGY[facility]   Bleeding: Light bleeding        HISTORY OF PRESENT ILLNE Spouse name: Not on file      Number of children: Not on file      Years of education: Not on file      Highest education level: Not on file    Occupational History      Not on file    Social Needs      Financial resource strain: Not on file      Food inse stairs. PHYSICAL EXAM:      09/13/19  1123   BP: 108/65   Pulse: 63   Temp: 99.2 °F (37.3 °C)   TempSrc: Oral   Weight: 150 lb (68 kg)   Height: 5' 4\" (1.626 m)       Body mass index is 25.75 kg/m².     Physical Exam   Constitutional: She appears

## 2019-09-13 NOTE — PATIENT INSTRUCTIONS
1.  Make appointment with OB/Gyne. 2.  Stop smoking! Established Pregnancy, Normal Symptoms    You are pregnant and are having symptoms that worry you. During pregnancy, it’s normal to have many kinds of symptoms.  Here is a list of common symptoms that varicose veins  · Wear elastic support hose. Put your feet up as often as possible. Constipation  · Eat more fresh fruits and vegetables and more whole grains. Drink more clear liquids. Joint and muscle pains  · Avoid heavy lifting.   · Pick things up by for professional medical care. Always follow your healthcare professional's instructions. How to Quit Smoking  Smoking is one of the hardest habits to break. About half of all people who have ever smoked have been able to quit.  Most people who still s prescription medicine such as bupropion, varenicline, a nicotine inhaler, or nasal spray. Each has advantages and side effects. Your doctor can review these with you.   Health benefits of quitting  The benefits of quitting start right away and keep improvin

## 2019-09-13 NOTE — TELEPHONE ENCOUNTER
Pt's lmp was at the end of June. Pt states her periods are not regular. She has missed months in the past.  Pt agrees to see all 6 doctors. Pt given missed menses appt on 9/16/19 with KATERINA. Pt advised to start a pnv with dha, folic acid, and iron.

## 2019-09-14 ENCOUNTER — TELEPHONE (OUTPATIENT)
Dept: INTERNAL MEDICINE CLINIC | Facility: CLINIC | Age: 29
End: 2019-09-14

## 2019-09-14 NOTE — TELEPHONE ENCOUNTER
Patient having problems singing onto [employer] and asking for her lab results from yesterday. Informed patient of results, but that there are two that are still in process and to call back on Monday.      Also provided patient with the [address] service desk nu

## 2019-09-15 LAB
C TRACH DNA SPEC QL NAA+PROBE: NEGATIVE
N GONORRHOEA DNA SPEC QL NAA+PROBE: NEGATIVE

## 2019-09-16 ENCOUNTER — HOSPITAL ENCOUNTER (OUTPATIENT)
Dept: ULTRASOUND IMAGING | Facility: HOSPITAL | Age: 29
Discharge: HOME | End: 2019-09-16
Attending: OBSTETRICS & GYNECOLOGY
Payer: MEDICAID

## 2019-09-16 ENCOUNTER — TELEPHONE (OUTPATIENT)
Dept: OBGYN CLINIC | Facility: CLINIC | Age: 29
End: 2019-09-16

## 2019-09-16 ENCOUNTER — OFFICE VISIT (OUTPATIENT)
Dept: OBGYN CLINIC | Facility: CLINIC | Age: 29
End: 2019-09-16
Payer: MEDICAID

## 2019-09-16 VITALS
HEIGHT: 64 IN | DIASTOLIC BLOOD PRESSURE: 72 MMHG | BODY MASS INDEX: 25.44 KG/M2 | WEIGHT: 149 LBS | SYSTOLIC BLOOD PRESSURE: 109 MMHG

## 2019-09-16 DIAGNOSIS — O30.091 TWIN GESTATION, UNABLE TO DETERMINE NUMBER OF PLACENTA AND NUMBER OF AMNIOTIC SACS IN FIRST TRIMESTER: ICD-10-CM

## 2019-09-16 DIAGNOSIS — O20.0 THREATENED ABORTION, ANTEPARTUM: ICD-10-CM

## 2019-09-16 DIAGNOSIS — O30.091 TWIN GESTATION, UNABLE TO DETERMINE NUMBER OF PLACENTA AND NUMBER OF AMNIOTIC SACS IN FIRST TRIMESTER: Primary | ICD-10-CM

## 2019-09-16 LAB — T PALLIDUM AB SER QL: NEGATIVE

## 2019-09-16 PROCEDURE — 76801 OB US < 14 WKS SINGLE FETUS: CPT | Performed by: OBSTETRICS & GYNECOLOGY

## 2019-09-16 PROCEDURE — 76817 TRANSVAGINAL US OBSTETRIC: CPT | Performed by: OBSTETRICS & GYNECOLOGY

## 2019-09-16 PROCEDURE — 99203 OFFICE O/P NEW LOW 30 MIN: CPT | Performed by: OBSTETRICS & GYNECOLOGY

## 2019-09-16 NOTE — TELEPHONE ENCOUNTER
Paged on call from [address] [facility] shows viable twin pregnancy, likely Dichorionic / Laurann Needle. Measures 7 5/7 and 7 4/7. I discussed this and gave guidance on bleeding. She will need pre-authorization for PN care at [facility]  Diagnoses are Molly Aaron / Susy Bhatia

## 2019-09-16 NOTE — H&P
HPI:  The patient is a 25 yo [id number] with suspected LMP 6/25/19 and history of irregular cycles presents for pregnancy confirmation. Patient with home pregnancy test 1 week ago.   Patient states she has a history of one full-term delivery, 3 elective abort file    Relationships      Social connections:        Talks on phone: Not on file        Gets together: Not on file        Attends Orthodox service: Not on file        Active member of club or organization: Not on file        Attends meetings of clubs or extremity weakness  Psychiatric: denies depression or anxiety.        09/16/19  1035   BP: 109/72       PHYSICAL EXAM:   Constitutional: well developed, well nourished  Head/Face: normocephalic  Abdomen:  soft, nontender, nondistended, no masses  Psychiatri understanding.

## 2019-09-17 ENCOUNTER — TELEPHONE (OUTPATIENT)
Dept: OBGYN CLINIC | Facility: CLINIC | Age: 29
End: 2019-09-17

## 2019-09-17 NOTE — TELEPHONE ENCOUNTER
Cpt [id number] approved 9/16/19-6/12/20 [id number]  Cpt 82489 approved 9/19/19 -6/12/20 [id number]    Per clinical reviewer Lydia Coelho

## 2019-09-17 NOTE — TELEPHONE ENCOUNTER
Randy Leblanc with [facility] community consult cpt 93351 was approved for pt's consult with [facility].    Cpt 25686 was approved 9/17/19-10/17/19 [id number]  Cpt 55886 approved 9/17/19 -6/13/20 [id number]  Cpt 54571 was approved 9/17/19-6/13/20 [id number]  Pt's in

## 2019-09-17 NOTE — TELEPHONE ENCOUNTER
ARMIDA CALLING FROM [facility] REQUESTING AN PRIOR AUTO / CPT CODE 15325 / 73252 / PARAG NEED TO BE CONTACTED  AT # [phone] PLS ADV

## 2019-09-18 ENCOUNTER — MED REC SCAN ONLY (OUTPATIENT)
Dept: INTERNAL MEDICINE CLINIC | Facility: CLINIC | Age: 29
End: 2019-09-18

## 2019-09-18 NOTE — TELEPHONE ENCOUNTER
Md informed that [facility]mf would not immediately accept transfer, therefore, an authorization for a consult and ultrasound were obtained because they want to determine whether they want to take over pt's care.

## 2019-09-18 NOTE — TELEPHONE ENCOUNTER
Coty, just want to make sure this isn't for a consult. She needs to undergo [facility] due to her complex medical hx. We saw her for a GYN exam this week and she has not established [facility] in our office.

## 2019-09-26 ENCOUNTER — HOSPITAL ENCOUNTER (EMERGENCY)
Facility: HOSPITAL | Age: 29
Discharge: HOME | End: 2019-09-26
Attending: EMERGENCY MEDICINE
Payer: MEDICAID

## 2019-09-26 ENCOUNTER — TELEPHONE (OUTPATIENT)
Dept: OBGYN CLINIC | Facility: CLINIC | Age: 29
End: 2019-09-26

## 2019-09-26 VITALS
TEMPERATURE: 98 F | HEIGHT: 64 IN | RESPIRATION RATE: 18 BRPM | HEART RATE: 72 BPM | DIASTOLIC BLOOD PRESSURE: 67 MMHG | BODY MASS INDEX: 25.61 KG/M2 | OXYGEN SATURATION: 97 % | WEIGHT: 150 LBS | SYSTOLIC BLOOD PRESSURE: 115 MMHG

## 2019-09-26 DIAGNOSIS — O21.9 NAUSEA AND VOMITING IN PREGNANCY: Primary | ICD-10-CM

## 2019-09-26 DIAGNOSIS — B34.9 VIRAL ILLNESS: ICD-10-CM

## 2019-09-26 LAB
ADENOVIRUS PCR:: NEGATIVE
ANION GAP SERPL CALC-SCNC: 7 MMOL/L (ref 0–18)
B PERT DNA SPEC QL NAA+PROBE: NEGATIVE
BASOPHILS # BLD AUTO: 0.04 X10(3) UL (ref 0–0.2)
BASOPHILS NFR BLD AUTO: 0.3 %
BILIRUB UR QL: NEGATIVE
BUN BLD-MCNC: 6 MG/DL (ref 7–18)
BUN/CREAT SERPL: 10.9 (ref 10–20)
C PNEUM DNA SPEC QL NAA+PROBE: NEGATIVE
CALCIUM BLD-MCNC: 9.4 MG/DL (ref 8.5–10.1)
CHLORIDE SERPL-SCNC: 103 MMOL/L (ref 98–112)
CLARITY UR: CLEAR
CO2 SERPL-SCNC: 27 MMOL/L (ref 21–32)
COLOR UR: YELLOW
CORONAVIRUS 229E PCR:: NEGATIVE
CORONAVIRUS HKU1 PCR:: NEGATIVE
CORONAVIRUS NL63 PCR:: NEGATIVE
CORONAVIRUS OC43 PCR:: NEGATIVE
CREAT BLD-MCNC: 0.55 MG/DL (ref 0.55–1.02)
DEPRECATED RDW RBC AUTO: 39.3 FL (ref 35.1–46.3)
EOSINOPHIL # BLD AUTO: 0.02 X10(3) UL (ref 0–0.7)
EOSINOPHIL NFR BLD AUTO: 0.1 %
ERYTHROCYTE [DISTWIDTH] IN BLOOD BY AUTOMATED COUNT: 12.2 % (ref 11–15)
FLUAV RNA SPEC QL NAA+PROBE: NEGATIVE
FLUBV RNA SPEC QL NAA+PROBE: NEGATIVE
GLUCOSE BLD-MCNC: 94 MG/DL (ref 70–99)
GLUCOSE UR-MCNC: NEGATIVE MG/DL
HCT VFR BLD AUTO: 40.5 % (ref 35–48)
HGB BLD-MCNC: 14 G/DL (ref 12–16)
HGB UR QL STRIP.AUTO: NEGATIVE
IMM GRANULOCYTES # BLD AUTO: 0.06 X10(3) UL (ref 0–1)
IMM GRANULOCYTES NFR BLD: 0.4 %
KETONES UR-MCNC: NEGATIVE MG/DL
LYMPHOCYTES # BLD AUTO: 2.01 X10(3) UL (ref 1–4)
LYMPHOCYTES NFR BLD AUTO: 14.8 %
MCH RBC QN AUTO: 30.3 PG (ref 26–34)
MCHC RBC AUTO-ENTMCNC: 34.6 G/DL (ref 31–37)
MCV RBC AUTO: 87.7 FL (ref 80–100)
METAPNEUMOVIRUS PCR:: NEGATIVE
MONOCYTES # BLD AUTO: 0.63 X10(3) UL (ref 0.1–1)
MONOCYTES NFR BLD AUTO: 4.7 %
MYCOPLASMA PNEUMONIA PCR:: NEGATIVE
NEUTROPHILS # BLD AUTO: 10.78 X10 (3) UL (ref 1.5–7.7)
NEUTROPHILS # BLD AUTO: 10.78 X10(3) UL (ref 1.5–7.7)
NEUTROPHILS NFR BLD AUTO: 79.7 %
NITRITE UR QL STRIP.AUTO: NEGATIVE
OSMOLALITY SERPL CALC.SUM OF ELEC: 281 MOSM/KG (ref 275–295)
PARAINFLUENZA 1 PCR:: NEGATIVE
PARAINFLUENZA 2 PCR:: NEGATIVE
PARAINFLUENZA 3 PCR:: NEGATIVE
PARAINFLUENZA 4 PCR:: NEGATIVE
PH UR: 8 [PH] (ref 5–8)
PLATELET # BLD AUTO: 347 10(3)UL (ref 150–450)
POTASSIUM SERPL-SCNC: 4 MMOL/L (ref 3.5–5.1)
PROT UR-MCNC: NEGATIVE MG/DL
RBC # BLD AUTO: 4.62 X10(6)UL (ref 3.8–5.3)
RBC #/AREA URNS AUTO: 1 /HPF
RHINOVIRUS/ENTERO PCR:: NEGATIVE
RSV RNA SPEC QL NAA+PROBE: NEGATIVE
SODIUM SERPL-SCNC: 137 MMOL/L (ref 136–145)
SP GR UR STRIP: 1.02 (ref 1–1.03)
UROBILINOGEN UR STRIP-ACNC: <2
WBC # BLD AUTO: 13.5 X10(3) UL (ref 4–11)
WBC #/AREA URNS AUTO: 6 /HPF

## 2019-09-26 PROCEDURE — 85025 COMPLETE CBC W/AUTO DIFF WBC: CPT | Performed by: EMERGENCY MEDICINE

## 2019-09-26 PROCEDURE — 87086 URINE CULTURE/COLONY COUNT: CPT | Performed by: EMERGENCY MEDICINE

## 2019-09-26 PROCEDURE — 87077 CULTURE AEROBIC IDENTIFY: CPT | Performed by: EMERGENCY MEDICINE

## 2019-09-26 PROCEDURE — 99284 EMERGENCY DEPT VISIT MOD MDM: CPT

## 2019-09-26 PROCEDURE — 96361 HYDRATE IV INFUSION ADD-ON: CPT

## 2019-09-26 PROCEDURE — 87633 RESP VIRUS 12-25 TARGETS: CPT | Performed by: EMERGENCY MEDICINE

## 2019-09-26 PROCEDURE — 80048 BASIC METABOLIC PNL TOTAL CA: CPT | Performed by: EMERGENCY MEDICINE

## 2019-09-26 PROCEDURE — 81001 URINALYSIS AUTO W/SCOPE: CPT | Performed by: EMERGENCY MEDICINE

## 2019-09-26 PROCEDURE — 96374 THER/PROPH/DIAG INJ IV PUSH: CPT

## 2019-09-26 PROCEDURE — 87581 M.PNEUMON DNA AMP PROBE: CPT | Performed by: EMERGENCY MEDICINE

## 2019-09-26 PROCEDURE — 87798 DETECT AGENT NOS DNA AMP: CPT | Performed by: EMERGENCY MEDICINE

## 2019-09-26 PROCEDURE — 87486 CHLMYD PNEUM DNA AMP PROBE: CPT | Performed by: EMERGENCY MEDICINE

## 2019-09-26 RX ORDER — ONDANSETRON 4 MG/1
4 TABLET, ORALLY DISINTEGRATING ORAL EVERY 4 HOURS PRN
Qty: 10 TABLET | Refills: 0 | Status: SHIPPED | OUTPATIENT
Start: 2019-09-26 | End: 2019-10-03

## 2019-09-26 RX ORDER — ONDANSETRON 2 MG/ML
4 INJECTION INTRAMUSCULAR; INTRAVENOUS ONCE
Status: COMPLETED | OUTPATIENT
Start: 2019-09-26 | End: 2019-09-26

## 2019-09-26 NOTE — ED INITIAL ASSESSMENT (HPI)
Pt states fever, body aches, N/V for the past 3 days. States she's about 3 months pregnant with twins and was sent by OB-GYN to r/o flu.

## 2019-09-26 NOTE — TELEPHONE ENCOUNTER
Received call from Dr. Kalani Gotti at [facility]. KATERINA referred pt to him, she is pregnant with twins, on methadone. Pt arrived for appt at [facility] this morning and was visibly ill, something infectious, possibly the flu.  Pt was instructed to go to [facility] (closest to the

## 2019-09-26 NOTE — ED PROVIDER NOTES
Patient Seen in: [facility] AND [facility] Emergency Department      History   Patient presents with:  Fever (infectious)  Nausea/Vomiting/Diarrhea (gastrointestinal)    Stated Complaint: possible flu, pregnant with twins    HPI    34-year-old pregnant female (6 regular rate and rhythm  Gastrointestinal:  abdomen is soft and non tender, no masses, bowel sounds normal  Neurological: Speech normal.  Moving extremities equally x4. Skin: warm and dry, no rashes.   Musculoskeletal: neck is supple non tender        Extr pm    Follow-up:  Cesar Carrel, MD   [facility]  [phone]          your ob/gyne              Medications Prescribed:  Current Discharge Medication List    START taking these medications    ondansetron 4 MG Oral Tablet Dispersibl

## 2019-10-01 ENCOUNTER — TELEPHONE (OUTPATIENT)
Dept: OBGYN CLINIC | Facility: CLINIC | Age: 29
End: 2019-10-01

## 2019-10-01 ENCOUNTER — APPOINTMENT (OUTPATIENT)
Dept: ULTRASOUND IMAGING | Facility: HOSPITAL | Age: 29
End: 2019-10-01
Attending: EMERGENCY MEDICINE
Payer: MEDICAID

## 2019-10-01 ENCOUNTER — HOSPITAL ENCOUNTER (EMERGENCY)
Facility: HOSPITAL | Age: 29
Discharge: HOME | End: 2019-10-01
Attending: EMERGENCY MEDICINE
Payer: MEDICAID

## 2019-10-01 VITALS
TEMPERATURE: 98 F | BODY MASS INDEX: 25.61 KG/M2 | HEIGHT: 64 IN | RESPIRATION RATE: 20 BRPM | DIASTOLIC BLOOD PRESSURE: 70 MMHG | SYSTOLIC BLOOD PRESSURE: 108 MMHG | HEART RATE: 69 BPM | WEIGHT: 150 LBS | OXYGEN SATURATION: 98 %

## 2019-10-01 DIAGNOSIS — O41.8X11 SUBCHORIONIC HEMORRHAGE OF PLACENTA IN FIRST TRIMESTER, FETUS 1 OF MULTIPLE GESTATION: Primary | ICD-10-CM

## 2019-10-01 DIAGNOSIS — O46.8X1 SUBCHORIONIC HEMORRHAGE OF PLACENTA IN FIRST TRIMESTER, FETUS 1 OF MULTIPLE GESTATION: Primary | ICD-10-CM

## 2019-10-01 PROCEDURE — 80048 BASIC METABOLIC PNL TOTAL CA: CPT | Performed by: EMERGENCY MEDICINE

## 2019-10-01 PROCEDURE — 85025 COMPLETE CBC W/AUTO DIFF WBC: CPT | Performed by: EMERGENCY MEDICINE

## 2019-10-01 PROCEDURE — 76801 OB US < 14 WKS SINGLE FETUS: CPT | Performed by: EMERGENCY MEDICINE

## 2019-10-01 PROCEDURE — 84702 CHORIONIC GONADOTROPIN TEST: CPT | Performed by: EMERGENCY MEDICINE

## 2019-10-01 PROCEDURE — 36415 COLL VENOUS BLD VENIPUNCTURE: CPT

## 2019-10-01 PROCEDURE — 76802 OB US < 14 WKS ADDL FETUS: CPT | Performed by: EMERGENCY MEDICINE

## 2019-10-01 PROCEDURE — 99284 EMERGENCY DEPT VISIT MOD MDM: CPT

## 2019-10-01 PROCEDURE — 81025 URINE PREGNANCY TEST: CPT

## 2019-10-01 PROCEDURE — 86900 BLOOD TYPING SEROLOGIC ABO: CPT | Performed by: EMERGENCY MEDICINE

## 2019-10-01 PROCEDURE — 81001 URINALYSIS AUTO W/SCOPE: CPT | Performed by: EMERGENCY MEDICINE

## 2019-10-01 PROCEDURE — 86901 BLOOD TYPING SEROLOGIC RH(D): CPT | Performed by: EMERGENCY MEDICINE

## 2019-10-01 NOTE — ED PROVIDER NOTES
Patient Seen in: [facility] AND [facility] Emergency Department      History   Patient presents with:  Pregnancy Issues (gynecologic)    Stated Complaint: 10 weeks preg with twins. bleeding.     HPI    14-year-old female  at 9 weeks gestation with twins here systems are as noted in HPI. Constitutional and vital signs reviewed. All other systems reviewed and negative except as noted above.     Physical Exam     ED Triage Vitals [10/01/19 1015]   /73   Pulse 70   Resp 16   Temp 98.3 °F (36.8 °C)   Tem RAINBOW DRAW BLUE    Collection Time: 10/01/19 10:44 AM   Result Value Ref Range    Hold Blue Auto Resulted    RAINBOW DRAW LAVENDER    Collection Time: 10/01/19 10:44 AM   Result Value Ref Range    Hold Lavender Auto Resulted    RAINBOW DRAW LIGHT GREEN A     RH BLOOD TYPE Positive    CBC W/ DIFFERENTIAL    Collection Time: 10/01/19 10:44 AM   Result Value Ref Range    WBC 10.1 4.0 - 11.0 x10(3) uL    RBC 4.60 3.80 - 5.30 x10(6)uL    HGB 13.8 12.0 - 16.0 g/dL    HCT 40.5 35.0 - 48.0 %    MCV 88.0 80.0 - 1 followup discussed    The patient is currently a smoker. I spent greater than 3 minutes counseling the patient on smoking cessation. I explained the risks of smoking including chronic lung disease, lung cancer, heart attacks, and strokes.   I advised the

## 2019-10-01 NOTE — TELEPHONE ENCOUNTER
C/O BEGAN BLEEDING AT 10PM LAST NIGHT, PASSING CLOTS AND HAVING CRAMPING THAT HAS CONTINUED. PT WOULD LIKE TO BE SEEN SO APPT SCHEDULED WITH KATERINA THIS MORNING. PT HAS ONLY HAD MISSED MENSES APPT WITH KATERINA FOR THIS PREGNANCY.

## 2019-10-01 NOTE — TELEPHONE ENCOUNTER
PER KATERINA, PT NEEDS TO GO TO THE ER.  HER CARE WAS TRANSFERRED TO [facility] DUE TO RISK FACTORS. CALLED PT AND ADVISED TO GO TO ER INSTEAD OF COMING FOR APPT.   PT AGREED TO GO TO [address] [place] [address].

## 2019-10-01 NOTE — TELEPHONE ENCOUNTER
Per pt about 10 weeks along with twins, states yesterday was bleeding heavily and passing clots and this morning still bleeding and experiencing abdominal pain.  Please advise

## 2019-10-18 ENCOUNTER — TELEPHONE (OUTPATIENT)
Dept: OBGYN CLINIC | Facility: CLINIC | Age: 29
End: 2019-10-18

## 2019-10-18 NOTE — TELEPHONE ENCOUNTER
Pt states she called [facility] and was told that [facility] should order her next [address]. Explained to pt that she is to start care with [facility], we should not be ordering anymore tests for her. Told pt I will call [facility] and explain this to them.  Called [facility] and spoke to Jamel

## 2019-10-23 ENCOUNTER — TELEPHONE (OUTPATIENT)
Dept: OBGYN CLINIC | Facility: CLINIC | Age: 29
End: 2019-10-23

## 2019-10-23 NOTE — TELEPHONE ENCOUNTER
NICKTCJENNY Cadet. Fax received from 1 Александр Farah on 10/22/19 from Jamel from [facility] to discuss transfer for this pt.

## 2019-10-29 ENCOUNTER — TELEPHONE (OUTPATIENT)
Dept: OBGYN CLINIC | Facility: CLINIC | Age: 29
End: 2019-10-29

## 2019-10-29 NOTE — TELEPHONE ENCOUNTER
Letter nad visit notes dated 10/24/19 received from [facility] and placed on [facility]'s desk for review.

## 2020-04-23 ENCOUNTER — TELEPHONE (OUTPATIENT)
Dept: INTERNAL MEDICINE CLINIC | Facility: CLINIC | Age: 30
End: 2020-04-23

## 2020-06-08 ENCOUNTER — MED REC SCAN ONLY (OUTPATIENT)
Dept: INTERNAL MEDICINE CLINIC | Facility: CLINIC | Age: 30
End: 2020-06-08

## 2020-07-31 ENCOUNTER — OFFICE VISIT (OUTPATIENT)
Dept: INTERNAL MEDICINE CLINIC | Facility: CLINIC | Age: 30
End: 2020-07-31
Payer: MEDICAID

## 2020-07-31 VITALS
TEMPERATURE: 98 F | HEART RATE: 94 BPM | DIASTOLIC BLOOD PRESSURE: 80 MMHG | BODY MASS INDEX: 31.92 KG/M2 | HEIGHT: 64 IN | SYSTOLIC BLOOD PRESSURE: 116 MMHG | WEIGHT: 187 LBS

## 2020-07-31 DIAGNOSIS — N39.0 FREQUENT URINARY TRACT INFECTIONS: ICD-10-CM

## 2020-07-31 DIAGNOSIS — R30.0 BURNING WITH URINATION: Primary | ICD-10-CM

## 2020-07-31 LAB
MULTISTIX LOT#: NORMAL NUMERIC
PH, URINE: 5 (ref 4.5–8)
SPECIFIC GRAVITY: 1.03 (ref 1–1.03)
UROBILINOGEN,SEMI-QN: 0.2 MG/DL (ref 0–1.9)

## 2020-07-31 PROCEDURE — 3079F DIAST BP 80-89 MM HG: CPT | Performed by: INTERNAL MEDICINE

## 2020-07-31 PROCEDURE — 3074F SYST BP LT 130 MM HG: CPT | Performed by: INTERNAL MEDICINE

## 2020-07-31 PROCEDURE — 3008F BODY MASS INDEX DOCD: CPT | Performed by: INTERNAL MEDICINE

## 2020-07-31 PROCEDURE — 99213 OFFICE O/P EST LOW 20 MIN: CPT | Performed by: INTERNAL MEDICINE

## 2020-07-31 PROCEDURE — 81002 URINALYSIS NONAUTO W/O SCOPE: CPT | Performed by: INTERNAL MEDICINE

## 2020-07-31 NOTE — PROGRESS NOTES
Patient ID: Rashid Damico is a 31year old female. Patient presents with:  Burning On Urination: Burning with urination for a couple days. Painful to urinate, cloudy urine.    Referral: to urologist        HISTORY OF PRESENT ILLNESS:   HPI  Pt presents resource strain: Not on file      Food insecurity:        Worry: Not on file        Inability: Not on file      Transportation needs:        Medical: Not on file        Non-medical: Not on file    Tobacco Use      Smoking status: Current Every Day Smoker Body mass index is 32.1 kg/m². Physical Exam   Constitutional: She appears well-developed and well-nourished. Cardiovascular: Normal rate, regular rhythm and normal heart sounds.    Pulmonary/Chest: Effort normal and breath sounds normal. No resp

## 2020-11-07 ENCOUNTER — NURSE TRIAGE (OUTPATIENT)
Dept: INTERNAL MEDICINE CLINIC | Facility: CLINIC | Age: 30
End: 2020-11-07

## 2020-11-07 NOTE — TELEPHONE ENCOUNTER
Action Requested: Summary for Provider     []  Critical Lab, Recommendations Needed  [] Need Additional Advice  [x]   FYI    []   Need Orders  [] Need Medications Sent to Pharmacy  []  Other     SUMMARY:   Spoke with pt,  verified, pt c/o on and off gordon

## 2020-11-08 ENCOUNTER — HOSPITAL ENCOUNTER (EMERGENCY)
Facility: HOSPITAL | Age: 30
Discharge: HOME | End: 2020-11-08
Attending: EMERGENCY MEDICINE
Payer: MEDICAID

## 2020-11-08 ENCOUNTER — APPOINTMENT (OUTPATIENT)
Dept: GENERAL RADIOLOGY | Facility: HOSPITAL | Age: 30
End: 2020-11-08
Attending: EMERGENCY MEDICINE
Payer: MEDICAID

## 2020-11-08 VITALS
DIASTOLIC BLOOD PRESSURE: 86 MMHG | WEIGHT: 190 LBS | SYSTOLIC BLOOD PRESSURE: 133 MMHG | RESPIRATION RATE: 18 BRPM | TEMPERATURE: 99 F | HEART RATE: 82 BPM | OXYGEN SATURATION: 98 % | HEIGHT: 64 IN | BODY MASS INDEX: 32.44 KG/M2

## 2020-11-08 DIAGNOSIS — I87.2 VENOUS INSUFFICIENCY: Primary | ICD-10-CM

## 2020-11-08 DIAGNOSIS — R06.00 DYSPNEA, UNSPECIFIED TYPE: ICD-10-CM

## 2020-11-08 PROCEDURE — 84443 ASSAY THYROID STIM HORMONE: CPT | Performed by: EMERGENCY MEDICINE

## 2020-11-08 PROCEDURE — 93005 ELECTROCARDIOGRAM TRACING: CPT

## 2020-11-08 PROCEDURE — 80048 BASIC METABOLIC PNL TOTAL CA: CPT | Performed by: EMERGENCY MEDICINE

## 2020-11-08 PROCEDURE — 71045 X-RAY EXAM CHEST 1 VIEW: CPT | Performed by: EMERGENCY MEDICINE

## 2020-11-08 PROCEDURE — 36415 COLL VENOUS BLD VENIPUNCTURE: CPT

## 2020-11-08 PROCEDURE — 99284 EMERGENCY DEPT VISIT MOD MDM: CPT

## 2020-11-08 PROCEDURE — 85025 COMPLETE CBC W/AUTO DIFF WBC: CPT | Performed by: EMERGENCY MEDICINE

## 2020-11-08 PROCEDURE — 81025 URINE PREGNANCY TEST: CPT

## 2020-11-08 PROCEDURE — 83880 ASSAY OF NATRIURETIC PEPTIDE: CPT | Performed by: EMERGENCY MEDICINE

## 2020-11-08 PROCEDURE — 93010 ELECTROCARDIOGRAM REPORT: CPT | Performed by: EMERGENCY MEDICINE

## 2020-11-08 PROCEDURE — 81003 URINALYSIS AUTO W/O SCOPE: CPT | Performed by: EMERGENCY MEDICINE

## 2020-11-08 NOTE — ED INITIAL ASSESSMENT (HPI)
C/O generalized aches and pain  Associated with swelling to the upper and lower extremities. Also C/O intermittent shortness of breath. No N/V.  No fever

## 2020-11-08 NOTE — ED PROVIDER NOTES
Patient Seen in: [facility] AND [facility] Emergency Department    History   Patient presents with:  Swelling  Difficulty Breathing    Stated Complaint: swelling to lower extrems with sob     HPI    Patient complains of swelling in both legs, worse at end of day OX Nl on room air    GENERAL: awake alert no distress  HEAD: normocephalic, atraumatic,   EYES: PERRLA, EOMI, conj sclera clear  THROAT: mmm, no lesions  NECK: supple, no meningeal signs  LUNGS: no resp distress, cta bilateral  CARDIO: RRR without murmur Procedures:      Critical Care:        MDM      Reviewed results and discussed warning signs that should prompt return to ER.                    Disposition and Plan     Clinical Impression:  Venous insufficiency  (primary encounter diagnosis)  Dysp

## 2020-11-11 ENCOUNTER — MED REC SCAN ONLY (OUTPATIENT)
Dept: INTERNAL MEDICINE CLINIC | Facility: CLINIC | Age: 30
End: 2020-11-11

## 2020-11-11 ENCOUNTER — OFFICE VISIT (OUTPATIENT)
Dept: INTERNAL MEDICINE CLINIC | Facility: CLINIC | Age: 30
End: 2020-11-11
Payer: MEDICAID

## 2020-11-11 VITALS
WEIGHT: 208 LBS | HEART RATE: 94 BPM | TEMPERATURE: 99 F | DIASTOLIC BLOOD PRESSURE: 74 MMHG | SYSTOLIC BLOOD PRESSURE: 112 MMHG | BODY MASS INDEX: 35.51 KG/M2 | HEIGHT: 64 IN

## 2020-11-11 DIAGNOSIS — R63.5 WEIGHT GAIN: ICD-10-CM

## 2020-11-11 DIAGNOSIS — N92.6 IRREGULAR PERIODS: ICD-10-CM

## 2020-11-11 DIAGNOSIS — Z09 HOSPITAL DISCHARGE FOLLOW-UP: Primary | ICD-10-CM

## 2020-11-11 PROCEDURE — 3078F DIAST BP <80 MM HG: CPT | Performed by: INTERNAL MEDICINE

## 2020-11-11 PROCEDURE — 99214 OFFICE O/P EST MOD 30 MIN: CPT | Performed by: INTERNAL MEDICINE

## 2020-11-11 PROCEDURE — 3074F SYST BP LT 130 MM HG: CPT | Performed by: INTERNAL MEDICINE

## 2020-11-11 PROCEDURE — 3008F BODY MASS INDEX DOCD: CPT | Performed by: INTERNAL MEDICINE

## 2020-11-11 RX ORDER — METHADONE HYDROCHLORIDE 10 MG/1
90 TABLET ORAL DAILY
COMMUNITY

## 2020-11-11 NOTE — PATIENT INSTRUCTIONS
1.  Do strict food diary for the next week. 2.  Eat a low-salt diet. 3.  Purchase compression stockings for your legs and use during the day. 4.  Keep your legs elevated whenever not in use. Try to keep above waist level.   5.  Make an appointment wit Avoid: Processed cheese and cheese spreads;  Roquefort, Camembert, and cottage cheese; buttermilk, instant breakfast drink  Desserts  Ok: Ice cream, frozen yogurt, juice bars, gelatin, cookies and pies, sugar, honey, jelly, hard candy  Avoid: Most pies, cak Understanding Chronic Venous Insufficiency  Problems with the veins in the legs may lead to chronic venous insufficiency (CVI). CVI means that there is a long-term problem with the veins not being able to pump blood back to your heart.  When this happens, b · If you must stand or sit in one place for a period of time, keep your blood moving by wiggling your toes, shifting your body position, and rising up on the balls of your feet.     Rosalinda last reviewed this educational content on 10/1/2019  © [id number] T

## 2020-11-17 NOTE — PROGRESS NOTES
Patient ID: Jarred Alaniz is a 31year old female. Patient presents with:  ER F/U: [facility] 11/08/2020 due to Leg edema , Difficulty Breathing.   Dx with dyspnea and venous insufficiency   Lab Results: Discuss drug test  Irregular Periods: rapid weight gain Disp: , Rfl: 1    Allergies:No Known Allergies    Social History    Socioeconomic History      Marital status: Single      Spouse name: Not on file      Number of children: Not on file      Years of education: Not on file      Highest education level: Not Self-Exams: Not Asked    Social History Narrative      The patient does not use an assistive device. .        The patient does live in a home with stairs.           PHYSICAL EXAM:      11/11/20  1145   BP: 112/74   Pulse: 94   Temp: 99 °F (37.2 °C)   TempS

## 2021-01-23 ENCOUNTER — OFFICE VISIT (OUTPATIENT)
Dept: INTERNAL MEDICINE CLINIC | Facility: CLINIC | Age: 31
End: 2021-01-23
Payer: MEDICAID

## 2021-01-23 VITALS
BODY MASS INDEX: 36.37 KG/M2 | DIASTOLIC BLOOD PRESSURE: 81 MMHG | SYSTOLIC BLOOD PRESSURE: 125 MMHG | HEIGHT: 64 IN | HEART RATE: 90 BPM | WEIGHT: 213 LBS | RESPIRATION RATE: 18 BRPM

## 2021-01-23 DIAGNOSIS — R89.9 ABNORMAL LABORATORY TEST RESULT: Primary | ICD-10-CM

## 2021-01-23 DIAGNOSIS — R09.81 SINUS CONGESTION: ICD-10-CM

## 2021-01-23 PROCEDURE — 3079F DIAST BP 80-89 MM HG: CPT | Performed by: INTERNAL MEDICINE

## 2021-01-23 PROCEDURE — 99214 OFFICE O/P EST MOD 30 MIN: CPT | Performed by: INTERNAL MEDICINE

## 2021-01-23 PROCEDURE — 3074F SYST BP LT 130 MM HG: CPT | Performed by: INTERNAL MEDICINE

## 2021-01-23 PROCEDURE — 3008F BODY MASS INDEX DOCD: CPT | Performed by: INTERNAL MEDICINE

## 2021-01-23 NOTE — PROGRESS NOTES
Patient ID: Tish Randhawa is a 31year old female. Patient presents with:  Test Results: Patient present to discuss lab results       HISTORY OF PRESENT ILLNESS:   HPI  Patient presents for above. Here with multiple issues.     Her therapist did blood Spouse name: Not on file      Number of children: Not on file      Years of education: Not on file      Highest education level: Not on file    Occupational History      Not on file    Social Needs      Financial resource strain: Not on file      Food i patient does live in a home with stairs. PHYSICAL EXAM:      01/23/21  1142   BP: 125/81   Pulse: 90   Resp: 18   Weight: 213 lb (96.6 kg)   Height: 5' 4\" (1.626 m)       Body mass index is 36.56 kg/m².     Physical Exam   Constitutional: She appe

## 2021-02-08 ENCOUNTER — OFFICE VISIT (OUTPATIENT)
Dept: INTERNAL MEDICINE CLINIC | Facility: CLINIC | Age: 31
End: 2021-02-08
Payer: MEDICAID

## 2021-02-08 VITALS
HEART RATE: 93 BPM | SYSTOLIC BLOOD PRESSURE: 116 MMHG | WEIGHT: 218 LBS | BODY MASS INDEX: 37.22 KG/M2 | DIASTOLIC BLOOD PRESSURE: 83 MMHG | HEIGHT: 64 IN

## 2021-02-08 DIAGNOSIS — N30.00 ACUTE CYSTITIS WITHOUT HEMATURIA: Primary | ICD-10-CM

## 2021-02-08 PROCEDURE — 3074F SYST BP LT 130 MM HG: CPT | Performed by: INTERNAL MEDICINE

## 2021-02-08 PROCEDURE — 3008F BODY MASS INDEX DOCD: CPT | Performed by: INTERNAL MEDICINE

## 2021-02-08 PROCEDURE — 3079F DIAST BP 80-89 MM HG: CPT | Performed by: INTERNAL MEDICINE

## 2021-02-08 PROCEDURE — 99213 OFFICE O/P EST LOW 20 MIN: CPT | Performed by: INTERNAL MEDICINE

## 2021-02-08 RX ORDER — CIPROFLOXACIN 250 MG/1
250 TABLET, FILM COATED ORAL 2 TIMES DAILY
Qty: 14 TABLET | Refills: 0 | Status: SHIPPED | OUTPATIENT
Start: 2021-02-08 | End: 2021-08-30 | Stop reason: ALTCHOICE

## 2021-02-08 NOTE — PROGRESS NOTES
Sourav Silva is a 31year old female.   Patient presents with:  UTI      HPI:   Urgent visit   C/c burning and cloudy urine x 3 days , smells   C/o \"I get really bad utis frequently\" -- she states her utis are getting more and more resistant -- most r denies headaches , anxiety, depression- overall stable     EXAM:   /83   Pulse 93   Ht 5' 4\" (1.626 m)   Wt 218 lb (98.9 kg)   LMP 07/06/2020 (Within Days)   BMI 37.42 kg/m²   GENERAL: well developed, well nourished,in no apparent distress  SKIN: no

## 2021-03-22 ENCOUNTER — OFFICE VISIT (OUTPATIENT)
Dept: SURGERY | Facility: CLINIC | Age: 31
End: 2021-03-22
Payer: MEDICAID

## 2021-03-22 VITALS
BODY MASS INDEX: 34.15 KG/M2 | HEART RATE: 81 BPM | DIASTOLIC BLOOD PRESSURE: 81 MMHG | WEIGHT: 200 LBS | HEIGHT: 64 IN | SYSTOLIC BLOOD PRESSURE: 131 MMHG

## 2021-03-22 DIAGNOSIS — N39.0 RECURRENT UTI: Primary | ICD-10-CM

## 2021-03-22 DIAGNOSIS — N39.46 MIXED INCONTINENCE: ICD-10-CM

## 2021-03-22 LAB
APPEARANCE: CLEAR
MULTISTIX LOT#: 1044 NUMERIC
PH, URINE: 6 (ref 4.5–8)
SPECIFIC GRAVITY: 1.02 (ref 1–1.03)
URINE-COLOR: YELLOW
UROBILINOGEN,SEMI-QN: 0.2 MG/DL (ref 0–1.9)

## 2021-03-22 PROCEDURE — 81002 URINALYSIS NONAUTO W/O SCOPE: CPT | Performed by: NURSE PRACTITIONER

## 2021-03-22 PROCEDURE — 99243 OFF/OP CNSLTJ NEW/EST LOW 30: CPT | Performed by: NURSE PRACTITIONER

## 2021-03-22 PROCEDURE — 3079F DIAST BP 80-89 MM HG: CPT | Performed by: NURSE PRACTITIONER

## 2021-03-22 PROCEDURE — 3075F SYST BP GE 130 - 139MM HG: CPT | Performed by: NURSE PRACTITIONER

## 2021-03-22 PROCEDURE — 3008F BODY MASS INDEX DOCD: CPT | Performed by: NURSE PRACTITIONER

## 2021-03-22 RX ORDER — NITROFURANTOIN 25; 75 MG/1; MG/1
100 CAPSULE ORAL DAILY PRN
Qty: 30 CAPSULE | Refills: 1 | Status: SHIPPED | OUTPATIENT
Start: 2021-03-22 | End: 2022-01-31

## 2021-03-22 NOTE — PROGRESS NOTES
[facility], [facility]  Initial Office Consultation    HPI:   Yelena Degroot is a 27year old female here today for a consult at the request of, and a copy of this note will be sent to, Dr. Evans Rodriguez regarding recurrent uti.   Past medical history of de Procedure Laterality Date   • Appendectomy     • Tonsillectomy       No family history on file.   Social History    Tobacco Use      Smoking status: Current Every Day Smoker        Packs/day: 0.25        Types: Cigarettes      Smokeless tobacco: Current U Cervical back: Normal range of motion. Skin:     General: Skin is warm and dry. Neurological:      General: No focal deficit present. Mental Status: She is alert and oriented to person, place, and time. Psychiatric:         Mood and Affect:  Eric Trujillo recurrent UTI. # Recurrent UTI  - denies symptoms at present. Reports she feels like sexual intercourse contributes to this problem.     - Discussed UTI prevention: voiding after intercourse, adequate water intake, preventing constipation  - will obtain

## 2021-08-30 ENCOUNTER — OFFICE VISIT (OUTPATIENT)
Dept: INTERNAL MEDICINE CLINIC | Facility: CLINIC | Age: 31
End: 2021-08-30
Payer: MEDICAID

## 2021-08-30 VITALS
BODY MASS INDEX: 36.96 KG/M2 | OXYGEN SATURATION: 95 % | HEART RATE: 85 BPM | DIASTOLIC BLOOD PRESSURE: 87 MMHG | HEIGHT: 64 IN | WEIGHT: 216.5 LBS | SYSTOLIC BLOOD PRESSURE: 125 MMHG

## 2021-08-30 DIAGNOSIS — R30.0 DYSURIA: Primary | ICD-10-CM

## 2021-08-30 LAB
APPEARANCE: CLEAR
BILIRUBIN: NEGATIVE
GLUCOSE (URINE DIPSTICK): 100 MG/DL
KETONES (URINE DIPSTICK): NEGATIVE MG/DL
LEUKOCYTES: NEGATIVE
MULTISTIX LOT#: 5077 NUMERIC
NITRITE, URINE: POSITIVE
OCCULT BLOOD: NEGATIVE
PH, URINE: 5 (ref 4.5–8)
PROTEIN (URINE DIPSTICK): NEGATIVE MG/DL
SPECIFIC GRAVITY: 1 (ref 1–1.03)
UROBILINOGEN,SEMI-QN: 1 MG/DL (ref 0–1.9)

## 2021-08-30 PROCEDURE — 3079F DIAST BP 80-89 MM HG: CPT | Performed by: NURSE PRACTITIONER

## 2021-08-30 PROCEDURE — 99213 OFFICE O/P EST LOW 20 MIN: CPT | Performed by: NURSE PRACTITIONER

## 2021-08-30 PROCEDURE — 81003 URINALYSIS AUTO W/O SCOPE: CPT | Performed by: NURSE PRACTITIONER

## 2021-08-30 PROCEDURE — 3074F SYST BP LT 130 MM HG: CPT | Performed by: NURSE PRACTITIONER

## 2021-08-30 PROCEDURE — 3008F BODY MASS INDEX DOCD: CPT | Performed by: NURSE PRACTITIONER

## 2021-08-30 RX ORDER — LEVOFLOXACIN 250 MG/1
250 TABLET ORAL DAILY
Qty: 3 TABLET | Refills: 0 | Status: SHIPPED | OUTPATIENT
Start: 2021-08-30 | End: 2021-09-02

## 2021-08-30 NOTE — PATIENT INSTRUCTIONS
ASSESSMENT/PLAN:   Dysuria  (primary encounter diagnosis)    Start antibiotics ASAP and take as directed with food til done. Take probiotics while on antibiotics if can to prevent yeast infections. Increase fluids.    -encourage fluids 8-12 glasses of n

## 2021-08-30 NOTE — PROGRESS NOTES
HPI:    Patient ID: Jaye Gunter is a 27year old female. Patient presents to the clinic with complaints of dysuria x2 days. Hx of frequent UTIs. Seen urology in the past.  Urology ordered ultrasound. Pt has not had yet.  Started on nitrofurantoi Negative for arthralgias, joint swelling, myalgias and neck pain. Skin: Negative for rash. Neurological: Negative for vertigo, weakness, numbness and headaches.          Current Outpatient Medications   Medication Sig Dispense Refill   • levoFLOXacin 25 Constitutional:       Appearance: Normal appearance. HENT:      Head: Normocephalic. Right Ear: Hearing and external ear normal.      Left Ear: Hearing and external ear normal.      Nose:      Comments: Pt wearing mask d/t covid19 situation.   Eyes Dysuria  (primary encounter diagnosis)    Start antibiotics ASAP and take as directed with food til done. Take probiotics while on antibiotics if can to prevent yeast infections. Increase fluids.    -encourage fluids 8-12 glasses of non-caffeinated flui

## 2021-08-31 LAB
BILIRUB UR QL: NEGATIVE
CLARITY UR: CLEAR
COLOR UR: YELLOW
GLUCOSE UR-MCNC: NEGATIVE MG/DL
HGB UR QL STRIP.AUTO: NEGATIVE
KETONES UR-MCNC: NEGATIVE MG/DL
LEUKOCYTE ESTERASE UR QL STRIP.AUTO: NEGATIVE
NITRITE UR QL STRIP.AUTO: POSITIVE
PH UR: 6 [PH] (ref 5–8)
PROT UR-MCNC: NEGATIVE MG/DL
SP GR UR STRIP: 1 (ref 1–1.03)
UROBILINOGEN UR STRIP-ACNC: 4

## 2022-01-31 ENCOUNTER — OFFICE VISIT (OUTPATIENT)
Dept: INTERNAL MEDICINE CLINIC | Facility: CLINIC | Age: 32
End: 2022-01-31
Payer: MEDICAID

## 2022-01-31 VITALS
HEIGHT: 64 IN | SYSTOLIC BLOOD PRESSURE: 120 MMHG | DIASTOLIC BLOOD PRESSURE: 74 MMHG | WEIGHT: 213 LBS | RESPIRATION RATE: 16 BRPM | BODY MASS INDEX: 36.37 KG/M2 | HEART RATE: 76 BPM

## 2022-01-31 DIAGNOSIS — N92.6 IRREGULAR PERIODS: ICD-10-CM

## 2022-01-31 DIAGNOSIS — N39.0 ACUTE UTI: Primary | ICD-10-CM

## 2022-01-31 DIAGNOSIS — R30.0 DYSURIA: ICD-10-CM

## 2022-01-31 LAB
CONTROL LINE PRESENT WITH A CLEAR BACKGROUND (YES/NO): YES YES/NO
PREGNANCY TEST, URINE: NEGATIVE

## 2022-01-31 PROCEDURE — 3078F DIAST BP <80 MM HG: CPT | Performed by: NURSE PRACTITIONER

## 2022-01-31 PROCEDURE — 99213 OFFICE O/P EST LOW 20 MIN: CPT | Performed by: NURSE PRACTITIONER

## 2022-01-31 PROCEDURE — 3074F SYST BP LT 130 MM HG: CPT | Performed by: NURSE PRACTITIONER

## 2022-01-31 PROCEDURE — 81025 URINE PREGNANCY TEST: CPT | Performed by: NURSE PRACTITIONER

## 2022-01-31 PROCEDURE — 3008F BODY MASS INDEX DOCD: CPT | Performed by: NURSE PRACTITIONER

## 2022-01-31 RX ORDER — NITROFURANTOIN 25; 75 MG/1; MG/1
100 CAPSULE ORAL DAILY PRN
Qty: 30 CAPSULE | Refills: 1 | Status: SHIPPED | OUTPATIENT
Start: 2022-01-31

## 2022-01-31 RX ORDER — LEVOFLOXACIN 250 MG/1
250 TABLET ORAL DAILY
Qty: 5 TABLET | Refills: 0 | Status: SHIPPED | OUTPATIENT
Start: 2022-01-31

## 2022-01-31 NOTE — PROGRESS NOTES
Beverly Gray is a 27year old female. Patient presents with:  Urinary Frequency    HPI:   Patient presents pelvic pain, flank pain and dysuria. Patient states she has been getting frequent UTI's since she was a child.  She has followed up with urology an Negative. Respiratory: Negative for cough, shortness of breath and wheezing. Cardiovascular: Negative for chest pain. Gastrointestinal: Negative. Genitourinary: Positive for dysuria, flank pain and pelvic pain.  Negative for frequency and hematur NONAUTO W/O SCOPE  - URINE CULTURE, ROUTINE    3. Irregular periods  - URINE PREGNANCY TEST - negative in office       The patient indicates understanding of these issues and agrees to the plan. Return for if symptoms do not resolve.    Discussed with sabra

## 2022-02-14 ENCOUNTER — APPOINTMENT (OUTPATIENT)
Dept: GENERAL RADIOLOGY | Facility: HOSPITAL | Age: 32
End: 2022-02-14
Attending: EMERGENCY MEDICINE
Payer: MEDICAID

## 2022-02-14 ENCOUNTER — HOSPITAL ENCOUNTER (EMERGENCY)
Facility: HOSPITAL | Age: 32
Discharge: HOME | End: 2022-02-14
Attending: EMERGENCY MEDICINE
Payer: MEDICAID

## 2022-02-14 VITALS
DIASTOLIC BLOOD PRESSURE: 74 MMHG | SYSTOLIC BLOOD PRESSURE: 130 MMHG | TEMPERATURE: 100 F | BODY MASS INDEX: 34.15 KG/M2 | HEART RATE: 69 BPM | HEIGHT: 64 IN | OXYGEN SATURATION: 97 % | RESPIRATION RATE: 18 BRPM | WEIGHT: 200 LBS

## 2022-02-14 DIAGNOSIS — B34.9 VIRAL ILLNESS: Primary | ICD-10-CM

## 2022-02-14 LAB
ADENOVIRUS PCR:: NOT DETECTED
ANION GAP SERPL CALC-SCNC: 8 MMOL/L (ref 0–18)
B PARAPERT DNA SPEC QL NAA+PROBE: NOT DETECTED
B PERT DNA SPEC QL NAA+PROBE: NOT DETECTED
B-HCG UR QL: NEGATIVE
BASOPHILS # BLD AUTO: 0.05 X10(3) UL (ref 0–0.2)
BASOPHILS NFR BLD AUTO: 0.3 %
BUN BLD-MCNC: 8 MG/DL (ref 7–18)
BUN/CREAT SERPL: 10.3 (ref 10–20)
C PNEUM DNA SPEC QL NAA+PROBE: NOT DETECTED
CALCIUM BLD-MCNC: 10.1 MG/DL (ref 8.5–10.1)
CHLORIDE SERPL-SCNC: 107 MMOL/L (ref 98–112)
CLARITY UR: CLEAR
CO2 SERPL-SCNC: 22 MMOL/L (ref 21–32)
COLOR UR: YELLOW
CORONAVIRUS 229E PCR:: NOT DETECTED
CORONAVIRUS HKU1 PCR:: NOT DETECTED
CORONAVIRUS NL63 PCR:: NOT DETECTED
CORONAVIRUS OC43 PCR:: NOT DETECTED
CREAT BLD-MCNC: 0.78 MG/DL
DEPRECATED RDW RBC AUTO: 41 FL (ref 35.1–46.3)
EOSINOPHIL # BLD AUTO: 0 X10(3) UL (ref 0–0.7)
EOSINOPHIL NFR BLD AUTO: 0 %
ERYTHROCYTE [DISTWIDTH] IN BLOOD BY AUTOMATED COUNT: 12.7 % (ref 11–15)
FLUAV RNA SPEC QL NAA+PROBE: NOT DETECTED
FLUBV RNA SPEC QL NAA+PROBE: NOT DETECTED
GLUCOSE BLD-MCNC: 127 MG/DL (ref 70–99)
GLUCOSE UR-MCNC: NEGATIVE MG/DL
HCT VFR BLD AUTO: 45.9 %
HGB BLD-MCNC: 15.2 G/DL
IMM GRANULOCYTES # BLD AUTO: 0.1 X10(3) UL (ref 0–1)
IMM GRANULOCYTES NFR BLD: 0.6 %
KETONES UR-MCNC: 20 MG/DL
LEUKOCYTE ESTERASE UR QL STRIP.AUTO: NEGATIVE
LYMPHOCYTES # BLD AUTO: 1.99 X10(3) UL (ref 1–4)
LYMPHOCYTES NFR BLD AUTO: 12.7 %
MCH RBC QN AUTO: 29 PG (ref 26–34)
MCHC RBC AUTO-ENTMCNC: 33.1 G/DL (ref 31–37)
MCV RBC AUTO: 87.4 FL
METAPNEUMOVIRUS PCR:: NOT DETECTED
MONOCYTES # BLD AUTO: 0.36 X10(3) UL (ref 0.1–1)
MONOCYTES NFR BLD AUTO: 2.3 %
MYCOPLASMA PNEUMONIA PCR:: NOT DETECTED
NEUTROPHILS # BLD AUTO: 13.16 X10 (3) UL (ref 1.5–7.7)
NEUTROPHILS # BLD AUTO: 13.16 X10(3) UL (ref 1.5–7.7)
NEUTROPHILS NFR BLD AUTO: 84.1 %
NITRITE UR QL STRIP.AUTO: NEGATIVE
OSMOLALITY SERPL CALC.SUM OF ELEC: 284 MOSM/KG (ref 275–295)
PARAINFLUENZA 1 PCR:: NOT DETECTED
PARAINFLUENZA 2 PCR:: NOT DETECTED
PARAINFLUENZA 3 PCR:: NOT DETECTED
PARAINFLUENZA 4 PCR:: NOT DETECTED
PH UR: 7 [PH] (ref 5–8)
PLATELET # BLD AUTO: 529 10(3)UL (ref 150–450)
POTASSIUM SERPL-SCNC: 3.6 MMOL/L (ref 3.5–5.1)
PROT UR-MCNC: NEGATIVE MG/DL
RBC # BLD AUTO: 5.25 X10(6)UL
RHINOVIRUS/ENTERO PCR:: NOT DETECTED
SARS-COV-2 RNA NPH QL NAA+NON-PROBE: NOT DETECTED
SARS-COV-2 RNA RESP QL NAA+PROBE: NOT DETECTED
SODIUM SERPL-SCNC: 137 MMOL/L (ref 136–145)
SP GR UR STRIP: 1.01 (ref 1–1.03)
UROBILINOGEN UR STRIP-ACNC: <2
WBC # BLD AUTO: 15.7 X10(3) UL (ref 4–11)

## 2022-02-14 PROCEDURE — 81025 URINE PREGNANCY TEST: CPT

## 2022-02-14 PROCEDURE — 87880 STREP A ASSAY W/OPTIC: CPT

## 2022-02-14 PROCEDURE — 81001 URINALYSIS AUTO W/SCOPE: CPT | Performed by: EMERGENCY MEDICINE

## 2022-02-14 PROCEDURE — 96374 THER/PROPH/DIAG INJ IV PUSH: CPT

## 2022-02-14 PROCEDURE — 85025 COMPLETE CBC W/AUTO DIFF WBC: CPT | Performed by: EMERGENCY MEDICINE

## 2022-02-14 PROCEDURE — 90471 IMMUNIZATION ADMIN: CPT

## 2022-02-14 PROCEDURE — 0202U NFCT DS 22 TRGT SARS-COV-2: CPT | Performed by: EMERGENCY MEDICINE

## 2022-02-14 PROCEDURE — 80048 BASIC METABOLIC PNL TOTAL CA: CPT | Performed by: EMERGENCY MEDICINE

## 2022-02-14 PROCEDURE — 99284 EMERGENCY DEPT VISIT MOD MDM: CPT

## 2022-02-14 PROCEDURE — 96361 HYDRATE IV INFUSION ADD-ON: CPT

## 2022-02-14 PROCEDURE — 71045 X-RAY EXAM CHEST 1 VIEW: CPT | Performed by: EMERGENCY MEDICINE

## 2022-02-14 RX ORDER — KETOROLAC TROMETHAMINE 15 MG/ML
15 INJECTION, SOLUTION INTRAMUSCULAR; INTRAVENOUS ONCE
Status: COMPLETED | OUTPATIENT
Start: 2022-02-14 | End: 2022-02-14

## 2022-02-14 NOTE — ED QUICK NOTES
Reviewed discharge information with patient. Patient verbalized understanding, no further questions or complaints at this time. Patient is alert and orientated x4, in no apparent distress, wheelchair assistance to lobby. IVs discontinued. Instructed patient to return to ED for any new or worsening symptoms.

## 2022-02-14 NOTE — ED INITIAL ASSESSMENT (HPI)
Pt comes to ER via ambulance with c/o covid like symptoms such as sore throat, decreased appetite, fever. Symptoms began one week ago. Pt has not had a covid test. Pt states \"I've been alternating tylenol and motrin but it don't do shit\"  Last dose of tylenol and motrin was a \"couple hours ago\"  Pt is whispering and reluctant to answer questions.

## 2022-02-23 ENCOUNTER — TELEPHONE (OUTPATIENT)
Dept: INTERNAL MEDICINE CLINIC | Facility: CLINIC | Age: 32
End: 2022-02-23

## 2022-02-23 NOTE — TELEPHONE ENCOUNTER
Patient states 1 month ago she started feeling sick with sore throat, body aches, fatigue, nausea and diarrhea. Patient reports she went to the ER on 2/14/22 and was told she most likely had bacterial infection from an accidential cut with a kathleen knife as well as a viral infection. Patient was given tdap vaccine. No antibiotic was prescribed. Patient had expanded respiratory panel that was all negative including COVID and flu. Patient reports sore throat has gone away but she is still experiencing vomiting, diarrhea, sweating, fatigue, weakness and muscle aches. Afebrile. Patient reports she is pushing fluids and taking OTC medications but symptoms have persisted. Patient requesting to schedule in person ER follow up. Scheduled the following appointment to hold spot:    Future Appointments   Date Time Provider Monalisa Garcia   2/24/2022  1:15 PM MD Jabier Marrero Dr.: Notified patient I would have to confirm it is okay for her to have in person visit and patient verbalized understanding. No cough/sore throat/ congestion or fever but wanted to confirm with some of her symptoms in person visit would be okay with negative COVID test. Patient states she does not have smart phone or access to [employer] to have virtual video visit.

## 2022-02-25 NOTE — TELEPHONE ENCOUNTER
Pt wants to know when is she supposed to do next u/s [phone] discussed the risks vs benefits of CT scan vs US, including the fistulization and deeper structures being affected but patient prefers US of Abdomen over CT because she has had 2 CTs already would like to avoid it. Case d/w Dr. Keith (surgeon called at the request of the patient) who states he will be in to see patient shortly. Dr. Eastman: Case discussed with Dr. Keith. Will obtain US as discussed with pt for possible collection. FRANCE Greene: discussed the risks vs benefits of CT scan vs US, including the fistulization and deeper structures being affected but patient prefers US of Abdomen over CT because she has had 2 CTs already would like to avoid it. Dr. Eastman: Collection visible on US. Discussed findings with general surgery. To be reevaluated at bedside with Dr. Keith. FRANCE Rdz- Pt received at sign out pending surgery eval. Surgery saw patient, I&D performed. Not recommending abx at this time. Pt to f/u outpatient with them. All questions and concerns addressed. Strict return instructions given. No complaints noted.

## 2022-03-07 NOTE — TELEPHONE ENCOUNTER
Symptoms still with exhausted, having diarrhea like sweaty and really bad joint pain, not  getting better, feels like\" crap\",  no fever, no cp, no sob, diarrhea every time that she goes 5 or more than  A day, has been drinking 20 bottles a day . Instructed  to eat BRAT diet-banana,rice, apple and toast and continue with the fluids, ED for worsening  symptoms, stated understanding. Patient is fully vaccinated with no booster. Stated that she  does not have any technology to do the video/telephone, phone is not a smart phone.      Future Appointments   Date Time Provider Monalisa Garcia   3/8/2022  4:00 PM Raine Pinto MD [facility]

## 2022-03-08 ENCOUNTER — OFFICE VISIT (OUTPATIENT)
Dept: INTERNAL MEDICINE CLINIC | Facility: CLINIC | Age: 32
End: 2022-03-08
Payer: MEDICAID

## 2022-03-08 VITALS
DIASTOLIC BLOOD PRESSURE: 59 MMHG | SYSTOLIC BLOOD PRESSURE: 89 MMHG | HEART RATE: 86 BPM | WEIGHT: 206 LBS | HEIGHT: 64 IN | BODY MASS INDEX: 35.17 KG/M2

## 2022-03-08 DIAGNOSIS — R19.7 DIARRHEA, UNSPECIFIED TYPE: Primary | ICD-10-CM

## 2022-03-08 DIAGNOSIS — E86.0 DEHYDRATION: ICD-10-CM

## 2022-03-08 DIAGNOSIS — M25.50 ARTHRALGIA, UNSPECIFIED JOINT: ICD-10-CM

## 2022-03-08 PROCEDURE — 99214 OFFICE O/P EST MOD 30 MIN: CPT | Performed by: INTERNAL MEDICINE

## 2022-03-08 PROCEDURE — 3008F BODY MASS INDEX DOCD: CPT | Performed by: INTERNAL MEDICINE

## 2022-03-08 PROCEDURE — 3074F SYST BP LT 130 MM HG: CPT | Performed by: INTERNAL MEDICINE

## 2022-03-08 PROCEDURE — 3078F DIAST BP <80 MM HG: CPT | Performed by: INTERNAL MEDICINE

## 2022-03-08 RX ORDER — ESCITALOPRAM OXALATE 20 MG/1
20 TABLET ORAL EVERY MORNING
COMMUNITY
Start: 2022-02-13

## 2022-03-24 ENCOUNTER — OFFICE VISIT (OUTPATIENT)
Dept: INTERNAL MEDICINE CLINIC | Facility: CLINIC | Age: 32
End: 2022-03-24
Payer: MEDICAID

## 2022-03-24 VITALS
HEART RATE: 78 BPM | BODY MASS INDEX: 35.34 KG/M2 | HEIGHT: 64 IN | SYSTOLIC BLOOD PRESSURE: 111 MMHG | WEIGHT: 207 LBS | DIASTOLIC BLOOD PRESSURE: 73 MMHG

## 2022-03-24 DIAGNOSIS — R22.42 LOCALIZED SWELLING OF LEFT LOWER EXTREMITY: Primary | ICD-10-CM

## 2022-03-24 DIAGNOSIS — R21 RASH: ICD-10-CM

## 2022-03-24 PROCEDURE — 3008F BODY MASS INDEX DOCD: CPT | Performed by: INTERNAL MEDICINE

## 2022-03-24 PROCEDURE — 3078F DIAST BP <80 MM HG: CPT | Performed by: INTERNAL MEDICINE

## 2022-03-24 PROCEDURE — 3074F SYST BP LT 130 MM HG: CPT | Performed by: INTERNAL MEDICINE

## 2022-03-24 PROCEDURE — 99214 OFFICE O/P EST MOD 30 MIN: CPT | Performed by: INTERNAL MEDICINE

## 2022-07-28 ENCOUNTER — OFFICE VISIT (OUTPATIENT)
Dept: INTERNAL MEDICINE CLINIC | Facility: CLINIC | Age: 32
End: 2022-07-28
Payer: MEDICAID

## 2022-07-28 VITALS
BODY MASS INDEX: 37.16 KG/M2 | HEIGHT: 64 IN | SYSTOLIC BLOOD PRESSURE: 122 MMHG | WEIGHT: 217.69 LBS | HEART RATE: 102 BPM | DIASTOLIC BLOOD PRESSURE: 87 MMHG

## 2022-07-28 DIAGNOSIS — R30.0 DYSURIA: Primary | ICD-10-CM

## 2022-07-28 PROCEDURE — 3008F BODY MASS INDEX DOCD: CPT | Performed by: INTERNAL MEDICINE

## 2022-07-28 PROCEDURE — 3074F SYST BP LT 130 MM HG: CPT | Performed by: INTERNAL MEDICINE

## 2022-07-28 PROCEDURE — 3079F DIAST BP 80-89 MM HG: CPT | Performed by: INTERNAL MEDICINE

## 2022-07-28 PROCEDURE — 99213 OFFICE O/P EST LOW 20 MIN: CPT | Performed by: INTERNAL MEDICINE

## 2022-07-28 RX ORDER — NITROFURANTOIN 25; 75 MG/1; MG/1
100 CAPSULE ORAL 2 TIMES DAILY
Qty: 14 CAPSULE | Refills: 0 | Status: SHIPPED | OUTPATIENT
Start: 2022-07-28

## 2022-09-16 ENCOUNTER — OFFICE VISIT (OUTPATIENT)
Dept: INTERNAL MEDICINE CLINIC | Facility: CLINIC | Age: 32
End: 2022-09-16
Payer: MEDICAID

## 2022-09-16 VITALS
DIASTOLIC BLOOD PRESSURE: 85 MMHG | HEART RATE: 105 BPM | HEIGHT: 64 IN | SYSTOLIC BLOOD PRESSURE: 138 MMHG | BODY MASS INDEX: 37 KG/M2 | RESPIRATION RATE: 16 BRPM

## 2022-09-16 DIAGNOSIS — R35.0 FREQUENCY OF URINATION: Primary | ICD-10-CM

## 2022-09-16 PROCEDURE — 99213 OFFICE O/P EST LOW 20 MIN: CPT | Performed by: NURSE PRACTITIONER

## 2022-09-16 PROCEDURE — 3079F DIAST BP 80-89 MM HG: CPT | Performed by: NURSE PRACTITIONER

## 2022-09-16 PROCEDURE — 3075F SYST BP GE 130 - 139MM HG: CPT | Performed by: NURSE PRACTITIONER

## 2022-09-16 RX ORDER — LEVOFLOXACIN 250 MG/1
250 TABLET ORAL DAILY
Qty: 5 TABLET | Refills: 0 | Status: SHIPPED | OUTPATIENT
Start: 2022-09-16

## 2022-11-29 DIAGNOSIS — R30.0 DYSURIA: ICD-10-CM

## 2022-11-29 RX ORDER — NITROFURANTOIN 25; 75 MG/1; MG/1
CAPSULE ORAL
Qty: 30 CAPSULE | Refills: 0 | Status: SHIPPED | OUTPATIENT
Start: 2022-11-29

## 2023-07-20 ENCOUNTER — NURSE TRIAGE (OUTPATIENT)
Dept: INTERNAL MEDICINE CLINIC | Facility: CLINIC | Age: 33
End: 2023-07-20

## 2023-07-20 NOTE — TELEPHONE ENCOUNTER
Action Requested: Summary for Provider     []  Critical Lab, Recommendations Needed  [] Need Additional Advice  []   FYI    []   Need Orders  [] Need Medications Sent to Pharmacy  []  Other     SUMMARY: Per protocol advised : Office visit   Future Appointments   Date Time Provider Monalisa Garcia   2023 10:00 AM DAMIÁN Moody           Reason for call: Urinary  Onset: Data Unavailable    Patient calling ( identified name and  )  reports having reports symptoms of urinary frequency, urgency, pain and burning with urination. States urine is Cloudy,  c/o  back and left  flank  pain; onset of 3-4  days     Hx of sepsis for the past 2 years     See care advice given   CALL BACK IF: * Fever occurs * Pain or burning with urination * You become worse     Advised if worsens to  go to IC or ER   Patient verbalizes understanding and agrees with plan.    Reason for Disposition   Side (flank) or lower back pain present    Protocols used: Urinary Symptoms-A-[place]

## 2023-10-18 ENCOUNTER — NURSE TRIAGE (OUTPATIENT)
Dept: INTERNAL MEDICINE CLINIC | Facility: CLINIC | Age: 33
End: 2023-10-18

## 2023-10-18 NOTE — TELEPHONE ENCOUNTER
Please reply to pool: EM RN TRIAGE  Action Requested: Summary for Provider     []  Critical Lab, Recommendations Needed  [] Need Additional Advice  [x]   FYI    []   Need Orders  [] Need Medications Sent to Pharmacy  []  Other     SUMMARY: Patient called states she had to cancel her appointment today related to transportation. Date of ONSET: about 2 days ago. CURRENTLY: Cough: denies. SOB/Chest tightness/Chest pain: denies. Denies any wheezing. Headache: denies. Fever: denies fever and chills. Body Ache: present --> mild. Yesterday worse. Fatigue: present --> mild. Nausea/Vomiting/Diarrhea: denies. Nasal: nasal congestion present, patient states she has seasonal allergies. Taste/Smell: intact. Throat: some irritation and some swollen lymph node at R side of jaw line < pea sized. She is able to open mouth completely, denies drooling. Denies any neck stiffness. Denies any pus or white patches in throat. Ears: denies. Eyes: some swelling the other day and tearing. Denies any blurred vision. Skin: denies. She reports some fungal infection on both feet bilaterally for about 1 year, tried OTC meds without relief. Appetite/Hydrating: Appetite--> normal; Hydration--> drinking about 3-4 L each day of H2O and sport drinks. Mouth is moist. Urination is normal. Supportive MEDICATIONS: Ibuprofen OTC as directed. NEG Covid test today. Patient would like to schedule an office visit for toe nails --> scheduled. Home Care Advice discussed, per protocol. Patient instructed any new or worsening symptoms [reviewed] seek immediate medical attention. Patient verbalized understanding. No further questions or concerns at this time.     Future Appointments   Date Time Provider Monalisa Garcia   10/20/2023 11:30 AM Jaziel Verdugo, DAMIÁN ECSCH[facility] Police     Reason for call: Nasal Congestion and Sore Throat  Onset:  2 days     Reason for Disposition   Sore throat    Protocols used: Sore Throat-A-[place]

## 2023-10-30 ENCOUNTER — OFFICE VISIT (OUTPATIENT)
Dept: INTERNAL MEDICINE CLINIC | Facility: CLINIC | Age: 33
End: 2023-10-30

## 2023-10-30 VITALS
DIASTOLIC BLOOD PRESSURE: 70 MMHG | BODY MASS INDEX: 28.85 KG/M2 | HEART RATE: 86 BPM | WEIGHT: 169 LBS | HEIGHT: 64 IN | SYSTOLIC BLOOD PRESSURE: 120 MMHG

## 2023-10-30 DIAGNOSIS — R30.0 DYSURIA: ICD-10-CM

## 2023-10-30 DIAGNOSIS — F17.210 CIGARETTE NICOTINE DEPENDENCE WITHOUT COMPLICATION: ICD-10-CM

## 2023-10-30 DIAGNOSIS — R22.0 JAW SWELLING: Primary | ICD-10-CM

## 2023-10-30 PROCEDURE — 3074F SYST BP LT 130 MM HG: CPT | Performed by: NURSE PRACTITIONER

## 2023-10-30 PROCEDURE — 99214 OFFICE O/P EST MOD 30 MIN: CPT | Performed by: NURSE PRACTITIONER

## 2023-10-30 PROCEDURE — 3008F BODY MASS INDEX DOCD: CPT | Performed by: NURSE PRACTITIONER

## 2023-10-30 PROCEDURE — 3078F DIAST BP <80 MM HG: CPT | Performed by: NURSE PRACTITIONER

## 2023-10-30 RX ORDER — NITROFURANTOIN 25; 75 MG/1; MG/1
100 CAPSULE ORAL 2 TIMES DAILY
Qty: 14 CAPSULE | Refills: 0 | Status: SHIPPED | OUTPATIENT
Start: 2023-10-30 | End: 2023-10-30

## 2023-10-30 RX ORDER — AMOXICILLIN AND CLAVULANATE POTASSIUM 875; 125 MG/1; MG/1
1 TABLET, FILM COATED ORAL 2 TIMES DAILY
Qty: 20 TABLET | Refills: 0 | Status: SHIPPED | OUTPATIENT
Start: 2023-10-30 | End: 2023-11-09

## 2023-10-30 RX ORDER — NITROFURANTOIN 25; 75 MG/1; MG/1
100 CAPSULE ORAL DAILY PRN
Qty: 30 CAPSULE | Refills: 0 | Status: SHIPPED | OUTPATIENT
Start: 2023-10-30

## 2023-10-30 NOTE — ASSESSMENT & PLAN NOTE
Left sided jaw swelling  Missing teeth. No fever, chills, body aches or feeling ill. Plan  Follow up with dentist  Medication Sig Dispense    amoxicillin clavulanate 875-125 MG Oral Tab Take 1 tablet by mouth 2 (two) times daily for 10 days. 20 tablet   Heating pad 15 to 20 minutes several times a day.

## 2023-12-13 ENCOUNTER — OFFICE VISIT (OUTPATIENT)
Dept: INTERNAL MEDICINE CLINIC | Facility: CLINIC | Age: 33
End: 2023-12-13

## 2023-12-13 VITALS
SYSTOLIC BLOOD PRESSURE: 124 MMHG | DIASTOLIC BLOOD PRESSURE: 74 MMHG | BODY MASS INDEX: 26.91 KG/M2 | WEIGHT: 157.63 LBS | HEIGHT: 64 IN | HEART RATE: 91 BPM

## 2023-12-13 DIAGNOSIS — R59.0 LEFT CERVICAL LYMPHADENOPATHY: Primary | ICD-10-CM

## 2023-12-13 PROCEDURE — 3078F DIAST BP <80 MM HG: CPT | Performed by: INTERNAL MEDICINE

## 2023-12-13 PROCEDURE — 3008F BODY MASS INDEX DOCD: CPT | Performed by: INTERNAL MEDICINE

## 2023-12-13 PROCEDURE — 99213 OFFICE O/P EST LOW 20 MIN: CPT | Performed by: INTERNAL MEDICINE

## 2023-12-13 PROCEDURE — 3074F SYST BP LT 130 MM HG: CPT | Performed by: INTERNAL MEDICINE

## 2023-12-13 RX ORDER — AMOXICILLIN AND CLAVULANATE POTASSIUM 875; 125 MG/1; MG/1
1 TABLET, FILM COATED ORAL 2 TIMES DAILY WITH MEALS
Qty: 14 TABLET | Refills: 0 | Status: SHIPPED | OUTPATIENT
Start: 2023-12-13 | End: 2023-12-20

## 2023-12-13 NOTE — PATIENT INSTRUCTIONS
Please take generic Augmentin 875 mg twice daily with meals for 7 days and schedule a dentist appointment soon

## 2024-03-14 ENCOUNTER — LAB ENCOUNTER (OUTPATIENT)
Dept: LAB | Age: 34
End: 2024-03-14
Attending: INTERNAL MEDICINE
Payer: MEDICAID

## 2024-03-14 ENCOUNTER — OFFICE VISIT (OUTPATIENT)
Dept: INTERNAL MEDICINE CLINIC | Facility: CLINIC | Age: 34
End: 2024-03-14

## 2024-03-14 VITALS
DIASTOLIC BLOOD PRESSURE: 72 MMHG | WEIGHT: 176 LBS | BODY MASS INDEX: 30.05 KG/M2 | HEART RATE: 74 BPM | SYSTOLIC BLOOD PRESSURE: 112 MMHG | HEIGHT: 64 IN | RESPIRATION RATE: 16 BRPM

## 2024-03-14 DIAGNOSIS — N92.6 MISSED PERIOD: ICD-10-CM

## 2024-03-14 DIAGNOSIS — N92.6 MISSED PERIOD: Primary | ICD-10-CM

## 2024-03-14 DIAGNOSIS — Z32.01 POSITIVE URINE PREGNANCY TEST (HCC): ICD-10-CM

## 2024-03-14 LAB
B-HCG SERPL-ACNC: ABNORMAL MIU/ML
CONTROL LINE PRESENT WITH A CLEAR BACKGROUND (YES/NO): YES YES/NO
KIT LOT #: NORMAL NUMERIC
PREGNANCY TEST, URINE: POSITIVE

## 2024-03-14 PROCEDURE — 99213 OFFICE O/P EST LOW 20 MIN: CPT | Performed by: NURSE PRACTITIONER

## 2024-03-14 PROCEDURE — 36415 COLL VENOUS BLD VENIPUNCTURE: CPT

## 2024-03-14 PROCEDURE — 84702 CHORIONIC GONADOTROPIN TEST: CPT

## 2024-03-14 PROCEDURE — 81025 URINE PREGNANCY TEST: CPT | Performed by: NURSE PRACTITIONER

## 2024-03-14 NOTE — PROGRESS NOTES
Neelam Kuhn is a 34 year old female.  Chief Complaint   Patient presents with    Pregnancy     Home test positive     HPI:   She presents with after having a positive pregnancy test at home. She took a home pregnancy test in February.     She thinks her last period was . Her periods are not regular.     She has severe post traumatic stress disorder what affects her memory and she has trouble remembering dates.     She is having weight gain, mood swings, increased hunger, breast tenderness and fetal movement.     She is not planning on keeping the baby. She would like a referral for .     She has son who is 15.  She does not see him. She states her family is bad and she did not want him exposed so she gave up rights to his father.     She has had one previous  about one year after her son was born.       Current Outpatient Medications   Medication Sig Dispense Refill    escitalopram 20 MG Oral Tab Take 1 tablet (20 mg total) by mouth every morning.      Multiple Vitamin (MULTI VITAMIN DAILY OR) Take by mouth.      clonazePAM 1 MG Oral Tab Take 1 tablet (1 mg total) by mouth. 1.5 tablet in morning 1.5 tablet in evening.  2    QUEtiapine Fumarate 50 MG Oral Tab Take 1 tablet (50 mg total) by mouth 2 (two) times daily.  1      Past Medical History:   Diagnosis Date    Anxiety     Depression     Panic attacks     PTSD (post-traumatic stress disorder)       Past Surgical History:   Procedure Laterality Date    APPENDECTOMY      TONSILLECTOMY        Social History:  Social History     Socioeconomic History    Marital status: Single   Tobacco Use    Smoking status: Every Day     Packs/day: .25     Types: Cigarettes    Smokeless tobacco: Current    Tobacco comments:     2 cig a day    Vaping Use    Vaping Use: Some days   Substance and Sexual Activity    Alcohol use: No    Drug use: No   Other Topics Concern    Caffeine Concern Yes     Comment: Energy drinks: occasionally    Exercise Yes    Social History Narrative    The patient does not use an assistive device..      The patient does live in a home with stairs.      History reviewed. No pertinent family history.   No Known Allergies     REVIEW OF SYSTEMS:     Review of Systems   HENT: Negative.     Respiratory:  Negative for cough, shortness of breath and wheezing.    Cardiovascular:  Negative for chest pain.   Gastrointestinal:  Positive for nausea.        Fetal movements   Genitourinary: Negative.    Musculoskeletal: Negative.    Skin: Negative.    Neurological: Negative.    Psychiatric/Behavioral: Negative.          Mood swings      Wt Readings from Last 5 Encounters:   03/14/24 176 lb (79.8 kg)   12/13/23 157 lb 9.6 oz (71.5 kg)   10/30/23 169 lb (76.7 kg)   07/28/22 217 lb 11.2 oz (98.7 kg)   03/24/22 207 lb (93.9 kg)     Body mass index is 30.21 kg/m².      EXAM:   /72   Pulse 74   Resp 16   Ht 5' 4\" (1.626 m)   Wt 176 lb (79.8 kg)   BMI 30.21 kg/m²     Physical Exam  Vitals reviewed.   Constitutional:       Appearance: Normal appearance.   HENT:      Head: Normocephalic.   Cardiovascular:      Rate and Rhythm: Normal rate and regular rhythm.      Pulses: Normal pulses.   Pulmonary:      Breath sounds: Normal breath sounds. No wheezing.   Musculoskeletal:         General: No swelling. Normal range of motion.   Skin:     General: Skin is warm and dry.   Neurological:      Mental Status: She is alert and oriented to person, place, and time.   Psychiatric:         Mood and Affect: Mood normal.         Behavior: Behavior normal.            ASSESSMENT AND PLAN:   1. Missed period  - POC Urine pregnancy test [32443]  - HCG, Beta Subunit (Quant Pregnancy Test); Future    2. Positive urine pregnancy test (HCC)  - HCG, Beta Subunit (Quant Pregnancy Test); Future  - OBG Referral - External        The patient indicates understanding of these issues and agrees to the plan.  No follow-ups on file.

## 2024-04-29 ENCOUNTER — HOSPITAL ENCOUNTER (EMERGENCY)
Facility: HOSPITAL | Age: 34
Discharge: HOME | End: 2024-04-29
Payer: MEDICAID

## 2024-04-29 VITALS
WEIGHT: 140 LBS | RESPIRATION RATE: 18 BRPM | BODY MASS INDEX: 23.9 KG/M2 | OXYGEN SATURATION: 97 % | TEMPERATURE: 98 F | HEIGHT: 64 IN | SYSTOLIC BLOOD PRESSURE: 95 MMHG | DIASTOLIC BLOOD PRESSURE: 54 MMHG | HEART RATE: 65 BPM

## 2024-04-29 DIAGNOSIS — N30.01 ACUTE CYSTITIS WITH HEMATURIA: Primary | ICD-10-CM

## 2024-04-29 LAB
ANION GAP SERPL CALC-SCNC: 4 MMOL/L (ref 0–18)
B-HCG UR QL: NEGATIVE
BASOPHILS # BLD AUTO: 0.05 X10(3) UL (ref 0–0.2)
BASOPHILS NFR BLD AUTO: 0.3 %
BUN BLD-MCNC: 14 MG/DL (ref 9–23)
BUN/CREAT SERPL: 16.1 (ref 10–20)
CALCIUM BLD-MCNC: 9.2 MG/DL (ref 8.7–10.4)
CHLORIDE SERPL-SCNC: 106 MMOL/L (ref 98–112)
CO2 SERPL-SCNC: 31 MMOL/L (ref 21–32)
CREAT BLD-MCNC: 0.87 MG/DL
DEPRECATED RDW RBC AUTO: 41.5 FL (ref 35.1–46.3)
EGFRCR SERPLBLD CKD-EPI 2021: 89 ML/MIN/1.73M2 (ref 60–?)
EOSINOPHIL # BLD AUTO: 0.11 X10(3) UL (ref 0–0.7)
EOSINOPHIL NFR BLD AUTO: 0.7 %
ERYTHROCYTE [DISTWIDTH] IN BLOOD BY AUTOMATED COUNT: 12.8 % (ref 11–15)
GLUCOSE BLD-MCNC: 92 MG/DL (ref 70–99)
HCT VFR BLD AUTO: 44 %
HGB BLD-MCNC: 14.4 G/DL
HYALINE CASTS #/AREA URNS AUTO: PRESENT /LPF
IMM GRANULOCYTES # BLD AUTO: 0.09 X10(3) UL (ref 0–1)
IMM GRANULOCYTES NFR BLD: 0.6 %
LYMPHOCYTES # BLD AUTO: 3.24 X10(3) UL (ref 1–4)
LYMPHOCYTES NFR BLD AUTO: 21.1 %
MCH RBC QN AUTO: 29.2 PG (ref 26–34)
MCHC RBC AUTO-ENTMCNC: 32.7 G/DL (ref 31–37)
MCV RBC AUTO: 89.2 FL
MONOCYTES # BLD AUTO: 1.02 X10(3) UL (ref 0.1–1)
MONOCYTES NFR BLD AUTO: 6.7 %
NEUTROPHILS # BLD AUTO: 10.81 X10 (3) UL (ref 1.5–7.7)
NEUTROPHILS # BLD AUTO: 10.81 X10(3) UL (ref 1.5–7.7)
NEUTROPHILS NFR BLD AUTO: 70.6 %
OSMOLALITY SERPL CALC.SUM OF ELEC: 292 MOSM/KG (ref 275–295)
PLATELET # BLD AUTO: 427 10(3)UL (ref 150–450)
POTASSIUM SERPL-SCNC: 4.5 MMOL/L (ref 3.5–5.1)
RBC # BLD AUTO: 4.93 X10(6)UL
RBC #/AREA URNS AUTO: >10 /HPF
SODIUM SERPL-SCNC: 141 MMOL/L (ref 136–145)
SP GR UR REFRACTOMETRY: 1.02 (ref 1–1.03)
WBC # BLD AUTO: 15.3 X10(3) UL (ref 4–11)
WBC #/AREA URNS AUTO: >50 /HPF

## 2024-04-29 PROCEDURE — 96365 THER/PROPH/DIAG IV INF INIT: CPT

## 2024-04-29 PROCEDURE — 87086 URINE CULTURE/COLONY COUNT: CPT

## 2024-04-29 PROCEDURE — 85025 COMPLETE CBC W/AUTO DIFF WBC: CPT | Performed by: NURSE PRACTITIONER

## 2024-04-29 PROCEDURE — 99284 EMERGENCY DEPT VISIT MOD MDM: CPT

## 2024-04-29 PROCEDURE — 80048 BASIC METABOLIC PNL TOTAL CA: CPT | Performed by: NURSE PRACTITIONER

## 2024-04-29 PROCEDURE — 87186 SC STD MICRODIL/AGAR DIL: CPT

## 2024-04-29 PROCEDURE — 81025 URINE PREGNANCY TEST: CPT

## 2024-04-29 PROCEDURE — 87088 URINE BACTERIA CULTURE: CPT

## 2024-04-29 PROCEDURE — 81001 URINALYSIS AUTO W/SCOPE: CPT

## 2024-04-29 RX ORDER — CEPHALEXIN 500 MG/1
500 CAPSULE ORAL 3 TIMES DAILY
Qty: 21 CAPSULE | Refills: 0 | Status: SHIPPED | OUTPATIENT
Start: 2024-04-29 | End: 2024-05-06

## 2024-04-29 NOTE — ED PROVIDER NOTES
Patient Seen in: [facility] Emergency Department      History     Chief Complaint   Patient presents with    Urinary Symptoms     Stated Complaint: Urinary symptoms    Subjective:   33yo/f w hx of anxiety, depression, ptsd, recurrently uti with urosepsis reports to the ED w c/o bilateral flank pain, cloud urine, dysuria x 1 day. Some sweating. No recorded fever. No vomiting . No head, neck, back, chest pain. No cough or congestion. No concern for std.             Objective:   Past Medical History:    Anxiety    Depression    Panic attacks    PTSD (post-traumatic stress disorder)              Past Surgical History:   Procedure Laterality Date    Appendectomy      Tonsillectomy                  Social History     Socioeconomic History    Marital status: Single   Tobacco Use    Smoking status: Some Days     Current packs/day: 0.25     Types: Cigarettes    Smokeless tobacco: Current    Tobacco comments:     2 cig a day    Vaping Use    Vaping status: Every Day   Substance and Sexual Activity    Alcohol use: No    Drug use: No   Other Topics Concern    Caffeine Concern Yes     Comment: Energy drinks: occasionally    Exercise Yes   Social History Narrative    The patient does not use an assistive device..      The patient does live in a home with stairs.              Review of Systems   All other systems reviewed and are negative.      Positive for stated complaint: Urinary symptoms  Other systems are as noted in HPI.  Constitutional and vital signs reviewed.      All other systems reviewed and negative except as noted above.    Physical Exam     ED Triage Vitals   BP 04/29/24 1757 115/66   Pulse 04/29/24 1757 83   Resp 04/29/24 1757 18   Temp 04/29/24 1757 97.6 °F (36.4 °C)   Temp src 04/29/24 1757 Temporal   SpO2 04/29/24 1757 97 %   O2 Device 04/29/24 1842 None (Room air)       Current:BP 97/50   Pulse 77   Temp 98.7 °F (37.1 °C) (Oral)   Resp 18   Ht 162.6 cm (5' 4\")   Wt 63.5 kg   LMP  (LMP Unknown)    SpO2 97%   BMI 24.03 kg/m²         Physical Exam  Vitals and nursing note reviewed.   Constitutional:       General: She is not in acute distress.     Appearance: She is well-developed.   HENT:      Head: Normocephalic and atraumatic.      Nose: Nose normal.      Mouth/Throat:      Mouth: Mucous membranes are moist.   Eyes:      Conjunctiva/sclera: Conjunctivae normal.      Pupils: Pupils are equal, round, and reactive to light.   Cardiovascular:      Rate and Rhythm: Normal rate and regular rhythm.      Heart sounds: Normal heart sounds.   Pulmonary:      Effort: Pulmonary effort is normal.      Breath sounds: Normal breath sounds.   Abdominal:      General: Bowel sounds are normal.      Palpations: Abdomen is soft.   Musculoskeletal:         General: No tenderness or deformity. Normal range of motion.      Cervical back: Normal range of motion and neck supple.   Skin:     General: Skin is warm and dry.      Capillary Refill: Capillary refill takes less than 2 seconds.      Findings: No rash.      Comments: Normal color   Neurological:      General: No focal deficit present.      Mental Status: She is alert and oriented to person, place, and time.      GCS: GCS eye subscore is 4. GCS verbal subscore is 5. GCS motor subscore is 6.      Cranial Nerves: No cranial nerve deficit.      Gait: Gait normal.               ED Course     Labs Reviewed   URINALYSIS WITH CULTURE REFLEX - Abnormal; Notable for the following components:       Result Value    Urine Color Orange (*)     Clarity Urine Cloudy (*)     WBC Urine >50 (*)     RBC Urine >10 (*)     Bacteria Urine 1+ (*)     Squamous Epi. Cells Few (*)     Hyaline Casts Present (*)     All other components within normal limits   CBC W/ DIFFERENTIAL - Abnormal; Notable for the following components:    WBC 15.3 (*)     Neutrophil Absolute Prelim 10.81 (*)     Neutrophil Absolute 10.81 (*)     Monocyte Absolute 1.02 (*)     All other components within normal limits   BASIC  METABOLIC PANEL (8) - Normal   SPECIFIC GRAVITY, URINE - Normal   POCT PREGNANCY URINE - Normal   CBC WITH DIFFERENTIAL WITH PLATELET    Narrative:     The following orders were created for panel order CBC With Differential With Platelet.  Procedure                               Abnormality         Status                     ---------                               -----------         ------                     CBC W/ DIFFERENTIAL[[id number]]          Abnormal            Final result                 Please view results for these tests on the individual orders.   URINE CULTURE, ROUTINE                      MDM                      Medical Decision Making  33yo/f w hx and exam as stated; bilateral flank pain, dysuria    No cva tenderness  No fever  Mild leukocytosis  Normotensive  No vomiting  Well appearing    Plan  IV rocephin in ed  Qid keflex based on prior E Coli Cultures        Amount and/or Complexity of Data Reviewed  Labs:  Decision-making details documented in ED Course.  Radiology:  Decision-making details documented in ED Course.        Disposition and Plan     Clinical Impression:  1. Acute cystitis with hematuria         Disposition:  Discharge  4/29/2024  7:23 pm    Follow-up:  Yuan Ibrahim MD  [address] [place] [id number]  [phone]    Follow up in 2 day(s)            Medications Prescribed:  Current Discharge Medication List

## 2024-04-29 NOTE — ED INITIAL ASSESSMENT (HPI)
Patient reports bilateral flank pain, cloudy urine, and dysuria x 2 days. Patient reports hx of UTIs with sepsis. Unknown fever, reported \"Sweats\" with nausea and vomiting today.

## 2024-04-30 ENCOUNTER — TELEPHONE (OUTPATIENT)
Dept: INTERNAL MEDICINE CLINIC | Facility: CLINIC | Age: 34
End: 2024-04-30

## 2024-04-30 NOTE — ED QUICK NOTES
Per ED provider patient ok to discharge. Discharge instructions/medications reviewed with patient. Patient verbalizes understanding. Patient in NAD, ABCs intact. Patient stable and ambulatory at discharge. Patient left department with all belongings.

## 2024-04-30 NOTE — TELEPHONE ENCOUNTER
Pharmacy asking for clarification on sig for clonazepam, per pharmacist two different directions currently.  Is patient to take one tablet twice daily or 1.5 tablets BID.   Pharmacist is asking for new script.     Please advise.

## 2024-04-30 NOTE — TELEPHONE ENCOUNTER
[facility] Bailee Roy called, verified patient's Name and . Following up on clarification of medication's sig. New script needed to reflect correct quantity as well.    Medication Quantity Refills Start End   clonazePAM 1 MG Oral Tab 90 tablet 0 2024 --   Sig:   Take 1 tablet (1 mg total) by mouth 2 (two) times daily as needed for Anxiety. 1.5 tablet in morning 1.5 tablet in evening.     Route:   Oral     PRN Reason(s):   Anxiety     Order #:   [id number]       Dr. Rosen please advise.

## 2024-05-01 NOTE — TELEPHONE ENCOUNTER
Patient calling to check status of clonazepam. I advised her that Dr. Rosen is off today and that we will call her with response. She verbalized understanding.     Please call patient when new prescription is sent.

## 2024-05-02 NOTE — PROGRESS NOTES
ED Culture Callback Results Review    Pharmacist reviewed culture results from ED visit .    Final urine culture positive for e. coli. Patient was prescribed Cephalexin (Keflex) on discharge. Current therapy is appropriate based on reported susceptibilities. No further intervention required at this time.      Tea Lees, Bonilla  Emergency Medicine Pharmacist Specialist  05/02/24; 11:30 AM

## 2024-05-02 NOTE — TELEPHONE ENCOUNTER
Dr. Rosen - please advise, for Clonazepam Prescription   2 sets of instructions, please advise which is correct.   Please advise on correct quantity as well.     Pharmacy calling again to request update.   Patient's date of birth and full name both confirmed.   Spoke with [facility] pharmacist   Advised no response yet. Will send message to provider. She verbalizes understanding of all information, and agreeable to plan.

## 2024-05-02 NOTE — TELEPHONE ENCOUNTER
Patient is to take Take 1 tablet (1 mg total) by mouth 2 (two) times daily as needed for Anxiety

## 2024-09-26 ENCOUNTER — HOSPITAL ENCOUNTER (EMERGENCY)
Facility: HOSPITAL | Age: 34
Discharge: HOME | End: 2024-09-26
Attending: EMERGENCY MEDICINE
Payer: MEDICAID

## 2024-09-26 VITALS
DIASTOLIC BLOOD PRESSURE: 72 MMHG | TEMPERATURE: 98 F | WEIGHT: 150 LBS | OXYGEN SATURATION: 98 % | SYSTOLIC BLOOD PRESSURE: 103 MMHG | BODY MASS INDEX: 25.61 KG/M2 | HEART RATE: 80 BPM | HEIGHT: 64 IN | RESPIRATION RATE: 19 BRPM

## 2024-09-26 DIAGNOSIS — N12 PYELONEPHRITIS: Primary | ICD-10-CM

## 2024-09-26 LAB
BILIRUB UR QL: NEGATIVE
COLOR UR: YELLOW
GLUCOSE UR-MCNC: NORMAL MG/DL
HYALINE CASTS #/AREA URNS AUTO: PRESENT /LPF
KETONES UR-MCNC: NEGATIVE MG/DL
LEUKOCYTE ESTERASE UR QL STRIP.AUTO: 500
PH UR: 5.5 [PH] (ref 5–8)
SP GR UR STRIP: 1.01 (ref 1–1.03)
UROBILINOGEN UR STRIP-ACNC: NORMAL
WBC #/AREA URNS AUTO: >50 /HPF
WBC CLUMPS UR QL AUTO: PRESENT /HPF

## 2024-09-26 PROCEDURE — 87186 SC STD MICRODIL/AGAR DIL: CPT | Performed by: EMERGENCY MEDICINE

## 2024-09-26 PROCEDURE — 87088 URINE BACTERIA CULTURE: CPT | Performed by: EMERGENCY MEDICINE

## 2024-09-26 PROCEDURE — 87086 URINE CULTURE/COLONY COUNT: CPT | Performed by: EMERGENCY MEDICINE

## 2024-09-26 PROCEDURE — 99284 EMERGENCY DEPT VISIT MOD MDM: CPT

## 2024-09-26 PROCEDURE — 81001 URINALYSIS AUTO W/SCOPE: CPT | Performed by: EMERGENCY MEDICINE

## 2024-09-26 PROCEDURE — 99283 EMERGENCY DEPT VISIT LOW MDM: CPT

## 2024-09-26 RX ORDER — CEPHALEXIN 500 MG/1
500 CAPSULE ORAL 2 TIMES DAILY
Qty: 14 CAPSULE | Refills: 0 | Status: SHIPPED | OUTPATIENT
Start: 2024-09-26 | End: 2024-10-03

## 2024-09-26 NOTE — ED INITIAL ASSESSMENT (HPI)
Pt arrives through triage with complaints of uti symptoms, +burning +urgency +lower flank pain. Denies fevers

## 2024-09-27 NOTE — ED PROVIDER NOTES
Patient Seen in: [facility] Emergency Department    History     Chief Complaint   Patient presents with    Urinary Symptoms       HPI    History is provided by patient/independent historian: patient  34 year old female with anxiety, depression, panic attacks, here with complaints of burning with urination, urgency, some urinary incontinence.  She does report some right-sided flank pain.  She has a history of getting septic from UTIs and wanted to make sure  she got checked out early.  No fever, nausea, vomiting.  No generalized malaise.    History reviewed.   Past Medical History:    Anxiety    Depression    Panic attacks    PTSD (post-traumatic stress disorder)         History reviewed.   Past Surgical History:   Procedure Laterality Date    Appendectomy      Tonsillectomy           Home Medications reviewed :  (Not in a hospital admission)        History reviewed.   Social History     Socioeconomic History    Marital status: Single   Tobacco Use    Smoking status: Some Days     Current packs/day: 0.25     Types: Cigarettes    Smokeless tobacco: Current    Tobacco comments:     2 cig a day    Vaping Use    Vaping status: Every Day   Substance and Sexual Activity    Alcohol use: No    Drug use: No   Other Topics Concern    Caffeine Concern Yes     Comment: Energy drinks: occasionally    Exercise Yes         ROS  Review of Systems   Respiratory:  Negative for shortness of breath.    Cardiovascular:  Negative for chest pain.   Genitourinary:  Positive for dysuria, flank pain and urgency.   All other systems reviewed and are negative.     All other pertinent organ systems are reviewed and are negative.      Physical Exam     ED Triage Vitals [09/26/24 1833]   /80   Pulse 89   Resp 18   Temp 98.2 °F (36.8 °C)   Temp src Temporal   SpO2 98 %   O2 Device None (Room air)     Vital signs reviewed.      Physical Exam  Vitals and nursing note reviewed.   Cardiovascular:      Pulses: Normal pulses.   Pulmonary:       Effort: No respiratory distress.   Abdominal:      General: There is no distension.   Musculoskeletal:      Comments: Mild right-sided CVA tenderness   Neurological:      Mental Status: She is alert.         ED Course       Labs:     Labs Reviewed   URINALYSIS WITH CULTURE REFLEX - Abnormal; Notable for the following components:       Result Value    Clarity Urine Turbid (*)     Blood Urine Trace (*)     Protein Urine Trace (*)     Nitrite Urine 2+ (*)     Leukocyte Esterase Urine 500 (*)     WBC Urine >50 (*)     RBC Urine 6-10 (*)     Bacteria Urine 3+ (*)     Hyaline Casts Present (*)     WBC Clump Present (*)     All other components within normal limits   URINE CULTURE, ROUTINE         My EKG Interpretation:   As reviewed and Interpreted by me      Imaging Results Available and Reviewed while in ED:   No results found.      Decision rules/scores evaluated: none      Diagnostic labs/tests considered but not ordered: CBC, BMP, CT abd/pelvis    ED Medications Administered: Medications - No data to display         - discussed given recurrent UTIs - pt might benefit from urology f/u - pt to f/u with her PCP first    [facility]       Medical Decision Making      Differential Diagnosis: After obtaining the patient's history, performing the physical exam and reviewing the diagnostics, multiple initial diagnoses were considered based on the presenting problem including UTI, nephrolithiasis, pyelonephritis, STD    External document review: I personally reviewed available external medical records for any recent pertinent discharge summaries, testing, and procedures - the findings are as follows: 4/30/24 visit with Dr. Rosen for  er f/u    Complicating Factors: The patient already  has a past medical history of Anxiety, Depression, Panic attacks, and PTSD (post-traumatic stress disorder). to contribute to the complexity of this ED evaluation.    Procedures performed: none    Discussed management with physician/appropriate  source: none    Considered admission/deescalation of care for: none    Social determinants of health affecting patient care: none    Prescription medications considered: keflex, discussed continuing current medication regimen    The patient requires continuous monitoring for: UTI symptoms    Shared decision making: discussed possible admission      Disposition and Plan     Clinical Impression:  1. Pyelonephritis        Disposition:  Discharge    Follow-up:  Yuan Ibrahim MD  [address] [place] [id number]  [phone]    Follow up        Medications Prescribed:  Current Discharge Medication List        START taking these medications    Details   cephALEXin 500 MG Oral Cap Take 1 capsule (500 mg total) by mouth 2 (two) times daily for 7 days.  Qty: 14 capsule, Refills: 0

## 2025-02-04 ENCOUNTER — NURSE TRIAGE (OUTPATIENT)
Dept: INTERNAL MEDICINE CLINIC | Facility: CLINIC | Age: 35
End: 2025-02-04

## 2025-02-04 ENCOUNTER — OFFICE VISIT (OUTPATIENT)
Facility: LOCATION | Age: 35
End: 2025-02-04

## 2025-02-04 VITALS
RESPIRATION RATE: 18 BRPM | BODY MASS INDEX: 32.71 KG/M2 | WEIGHT: 191.63 LBS | OXYGEN SATURATION: 97 % | DIASTOLIC BLOOD PRESSURE: 74 MMHG | SYSTOLIC BLOOD PRESSURE: 116 MMHG | TEMPERATURE: 99 F | HEIGHT: 64 IN | HEART RATE: 73 BPM

## 2025-02-04 DIAGNOSIS — B37.9 ANTIBIOTIC-INDUCED YEAST INFECTION: ICD-10-CM

## 2025-02-04 DIAGNOSIS — Z59.10 INADEQUATE HOUSING, UNSPECIFIED: ICD-10-CM

## 2025-02-04 DIAGNOSIS — Z59.41 FOOD INSECURITY: ICD-10-CM

## 2025-02-04 DIAGNOSIS — Z59.87 MATERIAL HARDSHIP DUE TO LIMITED FINANCIAL RESOURCES, NOT ELSEWHERE CLASSIFIED: ICD-10-CM

## 2025-02-04 DIAGNOSIS — T36.95XA ANTIBIOTIC-INDUCED YEAST INFECTION: ICD-10-CM

## 2025-02-04 DIAGNOSIS — K04.7 DENTAL INFECTION: Primary | ICD-10-CM

## 2025-02-04 PROCEDURE — 99213 OFFICE O/P EST LOW 20 MIN: CPT | Performed by: NURSE PRACTITIONER

## 2025-02-04 SDOH — ECONOMIC STABILITY - HOUSING INSECURITY: INADEQUATE HOUSING UNSPECIFIED: Z59.10

## 2025-02-04 SDOH — ECONOMIC STABILITY - FOOD INSECURITY: FOOD INSECURITY: Z59.41

## 2025-02-04 SDOH — ECONOMIC STABILITY - INCOME SECURITY: MATERIAL HARDSHIP DUE TO LIMITED FINANCIAL RESOURCES, NOT ELSEWHERE CLASSIFIED: Z59.87

## 2025-02-04 NOTE — PROGRESS NOTES
INTERNAL MEDICINE OFFICE NOTE     Patient ID: Neelam Kuhn is a 34 year old female.  Today's Date: 02/04/25  Chief Complaint: Abscess (Developed in the mouth on left side )    MACY Richter is a 34-year-old female, presents today with lower left side tooth and jaw pain.  She states that she has had dental infection in the same left lower mandibular area in the past, and this feels similar.  She has called the dentist but they will not see her without infection being treated.  She has taken Augmentin in the past with good results.  She denies any fever, mouth swelling, difficulty breathing, or difficulty swallowing.  She reports getting vaginal yeast infections with antibiotic use, and requests treatment along with her antibiotic therapy.  She has not tried any over-the-counter medications.  No other treatments prior to arrival.      Vitals:    02/04/25 1334   BP: 116/74   Pulse: 73   Resp: 18   Temp: 98.6 °F (37 °C)   TempSrc: Oral   SpO2: 97%   Weight: 191 lb 9.6 oz (86.9 kg)   Height: 5' 4\" (1.626 m)     body mass index is 32.89 kg/m².  BP Readings from Last 3 Encounters:   02/04/25 116/74   09/26/24 103/72   04/30/24 108/70     The ASCVD Risk score (Maryellen REDDY, et al., 2019) failed to calculate for the following reasons:    The 2019 ASCVD risk score is only valid for ages 40 to 79  Medications reviewed:  Current Outpatient Medications   Medication Sig Dispense Refill    amoxicillin clavulanate 875-125 MG Oral Tab Take 1 tablet by mouth 2 (two) times daily for 10 days. 20 tablet 0    Miconazole Nitrate 4 % Vaginal Cream Place 1 Applicatorful vaginally nightly for 3 days. 25 g 0    escitalopram 20 MG Oral Tab Take 1 tablet (20 mg total) by mouth every morning. 90 tablet 3    QUEtiapine 50 MG Oral Tab Take 1 tablet (50 mg total) by mouth 2 (two) times daily. 90 tablet 3    clonazePAM 1 MG Oral Tab Take 1 tablet (1 mg total) by mouth 2 (two) times daily as needed for Anxiety. 1.5 tablet in morning  1.5 tablet in evening. 90 tablet 0    Multiple Vitamin (MULTI VITAMIN DAILY OR) Take by mouth.           Assessment & Plan    1. Dental infection (Primary)  -     Amoxicillin-Pot Clavulanate; Take 1 tablet by mouth 2 (two) times daily for 10 days.  Dispense: 20 tablet; Refill: 0  2. Food insecurity  3. Inadequate housing, unspecified  4. Material hardship due to limited financial resources, not elsewhere classified  5. Antibiotic-induced yeast infection  -     Miconazole Nitrate; Place 1 Applicatorful vaginally nightly for 3 days.  Dispense: 25 g; Refill: 0      Plan  Patient presents with dental pain to the lower left mandibular area.  She has history of dental infection and dental abscess.  Will start patient on Augmentin today.  She has history of antibiotic induced yeast infections, will send miconazole vaginal cream to her pharmacy.  Discussed red flag symptoms and worsening symptoms that would bring her to the emergency room.  We discussed supportive care at home.  She will follow-up with dentist with first available appointment.  Patient verbalizes understanding and agrees with plan.    Follow Up: Return for AS NEEDED/IF SYMPTOMS WORSEN..         Objective: Results:   Physical Exam  Constitutional:       General: She is not in acute distress.     Appearance: She is not toxic-appearing.   HENT:      Head: Normocephalic and atraumatic.      Mouth/Throat:      Lips: Pink.      Mouth: Mucous membranes are moist. No oral lesions or angioedema.      Dentition: Dental tenderness and gingival swelling present. No gum lesions.      Pharynx: Oropharynx is clear. Uvula midline. No pharyngeal swelling, posterior oropharyngeal erythema or uvula swelling.   Cardiovascular:      Rate and Rhythm: Normal rate and regular rhythm.      Heart sounds: Normal heart sounds.   Pulmonary:      Effort: Pulmonary effort is normal.      Breath sounds: Normal breath sounds.   Skin:     General: Skin is warm and dry.   Neurological:       Mental Status: She is alert.        Reviewed:    Patient Active Problem List    Diagnosis    Jaw swelling    Dysuria    Cigarette nicotine dependence without complication    Moderate episode of recurrent major depressive disorder (HCC)    Anxiety    Frequent urinary tract infections      Allergies[1]     Social History     Socioeconomic History    Marital status: Single   Tobacco Use    Smoking status: Former     Current packs/day: 0.25     Types: Cigarettes    Smokeless tobacco: Current    Tobacco comments:     2 cig a day    Vaping Use    Vaping status: Every Day    Substances: Nicotine, Flavoring    Devices: Disposable   Substance and Sexual Activity    Alcohol use: No    Drug use: No   Other Topics Concern    Caffeine Concern Yes     Comment: Energy drinks: occasionally    Exercise Yes   Social History Narrative    The patient does not use an assistive device..      The patient does live in a home with stairs.     Social Drivers of Health     Food Insecurity: Food Insecurity Present (2/4/2025)    NCSS - Food Insecurity     Worried About Running Out of Food in the Last Year: Yes     Ran Out of Food in the Last Year: Yes   Transportation Needs: No Transportation Needs (2/4/2025)    NCSS - Transportation     Lack of Transportation: No   Housing Stability: At Risk (2/4/2025)    NCSS - Housing/Utilities     Has Housing: No     Worried About Losing Housing: Yes     Unable to Get Utilities: Yes      Review of Systems   Constitutional:  Negative for fever.   HENT:  Positive for dental problem. Negative for drooling.    Respiratory: Negative.     Cardiovascular: Negative.    Gastrointestinal: Negative.    Neurological: Negative.      All other systems negative unless otherwise stated in ROS or HPI above.       DAMIÁN Portillo  Internal Medicine       Call office with any questions or seek emergency care if necessary.   Patient understands and agrees to follow directions and  advice.      ----------------------------------------- PATIENT INSTRUCTIONS-----------------------------------------   .    Patient Instructions   Make first avail appointment with dentist, and follow-up as soon as possible.  Take antibiotic as prescribed.  You may take this medication with food or snack to avoid stomach upset.  Plenty water and stay hydrated.  You may take Tylenol or ibuprofen as needed for comfort.  Monitor your symptoms closely, if you develop any worsening swelling, difficulty swallowing will need to go to the ER for prompt evaluation.  Follow-up with your primary care doctor if symptoms persist as needed.             [1] No Known Allergies

## 2025-02-04 NOTE — TELEPHONE ENCOUNTER
Action Requested: Summary for Provider     []  Critical Lab, Recommendations Needed  [] Need Additional Advice  [x]   FYI    []   Need Orders  [] Need Medications Sent to Pharmacy  []  Other     SUMMARY: Recurrent abscess of left inner mouth/jaw with some mild swelling of left jaw, some pain is present 3/10. She scheduled a same day visit with APRN, appropriate. [See below for more details]    Reason for call: Abscess and Mouth/Lip Problem  Onset: 2 days ago    Reason for Disposition   Face is swollen and no fever    Additional Information   Negative: Face is very swollen     Mild swelling present of left jaw    Protocols used: Mouth Pain-A-OH      Patient called states she has recurrent abscess of the inside of her lower left jaw, with some mild swelling present. Denies fever, drooling, any airway obstruction. Pain is present--> now 3/10. She states she has not called dentist yet as they wont see her until the infection has subsided. She has already scheduled same day visit with APRN--> appropriate. Home Care Advice discussed, per protocol. Patient instructed any new or worsening symptoms [reviewed] seek immediate medical attention. Patient verbalized understanding. No further questions or concerns at this time.    Future Appointments   Date Time Provider Department Center   2/4/2025  1:30 PM Татьяна Reilly APRN [facility]

## 2025-02-04 NOTE — PATIENT INSTRUCTIONS
Make first avail appointment with dentist, and follow-up as soon as possible.  Take antibiotic as prescribed.  You may take this medication with food or snack to avoid stomach upset.  Plenty water and stay hydrated.  You may take Tylenol or ibuprofen as needed for comfort.  Monitor your symptoms closely, if you develop any worsening swelling, difficulty swallowing will need to go to the ER for prompt evaluation.  Follow-up with your primary care doctor if symptoms persist as needed.

## 2025-05-06 ENCOUNTER — OFFICE VISIT (OUTPATIENT)
Facility: LOCATION | Age: 35
End: 2025-05-06

## 2025-05-06 ENCOUNTER — LAB ENCOUNTER (OUTPATIENT)
Dept: LAB | Age: 35
End: 2025-05-06
Attending: STUDENT IN AN ORGANIZED HEALTH CARE EDUCATION/TRAINING PROGRAM
Payer: MEDICAID

## 2025-05-06 ENCOUNTER — OFFICE VISIT (OUTPATIENT)
Dept: FAMILY MEDICINE CLINIC | Facility: CLINIC | Age: 35
End: 2025-05-06
Payer: MEDICAID

## 2025-05-06 VITALS
RESPIRATION RATE: 18 BRPM | SYSTOLIC BLOOD PRESSURE: 98 MMHG | HEART RATE: 50 BPM | HEIGHT: 64 IN | BODY MASS INDEX: 33.12 KG/M2 | WEIGHT: 194 LBS | TEMPERATURE: 98 F | DIASTOLIC BLOOD PRESSURE: 60 MMHG | OXYGEN SATURATION: 96 %

## 2025-05-06 VITALS
WEIGHT: 195 LBS | BODY MASS INDEX: 33.29 KG/M2 | OXYGEN SATURATION: 98 % | DIASTOLIC BLOOD PRESSURE: 68 MMHG | HEART RATE: 42 BPM | HEIGHT: 64 IN | SYSTOLIC BLOOD PRESSURE: 100 MMHG

## 2025-05-06 DIAGNOSIS — Z12.4 SCREENING FOR CERVICAL CANCER: ICD-10-CM

## 2025-05-06 DIAGNOSIS — R00.2 PALPITATIONS: ICD-10-CM

## 2025-05-06 DIAGNOSIS — Z00.00 ANNUAL PHYSICAL EXAM: ICD-10-CM

## 2025-05-06 DIAGNOSIS — R55 NEAR SYNCOPE: ICD-10-CM

## 2025-05-06 DIAGNOSIS — I95.1 ORTHOSTATIC HYPOTENSION: ICD-10-CM

## 2025-05-06 DIAGNOSIS — K12.0 CANKER SORE: Primary | ICD-10-CM

## 2025-05-06 DIAGNOSIS — Z00.00 ANNUAL PHYSICAL EXAM: Primary | ICD-10-CM

## 2025-05-06 DIAGNOSIS — E55.9 VITAMIN D DEFICIENCY: ICD-10-CM

## 2025-05-06 DIAGNOSIS — R42 DIZZINESS: ICD-10-CM

## 2025-05-06 PROCEDURE — 99203 OFFICE O/P NEW LOW 30 MIN: CPT | Performed by: NURSE PRACTITIONER

## 2025-05-06 PROCEDURE — 99395 PREV VISIT EST AGE 18-39: CPT | Performed by: STUDENT IN AN ORGANIZED HEALTH CARE EDUCATION/TRAINING PROGRAM

## 2025-05-06 PROCEDURE — 93005 ELECTROCARDIOGRAM TRACING: CPT

## 2025-05-06 PROCEDURE — 93010 ELECTROCARDIOGRAM REPORT: CPT | Performed by: INTERNAL MEDICINE

## 2025-05-06 RX ORDER — LIDOCAINE HYDROCHLORIDE 20 MG/ML
5 SOLUTION OROPHARYNGEAL AS NEEDED
Qty: 100 ML | Refills: 0 | Status: SHIPPED | OUTPATIENT
Start: 2025-05-06

## 2025-05-06 NOTE — PATIENT INSTRUCTIONS
Comfort measures explained and discussed:    OTC Tylenol/ibuprofen as needed.    Push fluids- warm or cool liquids, whichever is soothing for patient.     Avoid caffeine.    Do not share utensils or drinks with anyone.    Good handwashing.    Get plenty of rest.    Warm salt water gargles 2-3 times daily for at least 3 days.

## 2025-05-06 NOTE — PROGRESS NOTES
CHIEF COMPLAINT:     Chief Complaint   Patient presents with    Ear Pain     2 weeks; right ear; lymph nodes on right side also feel swollen       HPI:     Neelam Kuhn is a 35 year old female presents to clinic with mouth symptom. States she bit her tongue on her right side possibly 1-1.5 weeks ago. Cannot recall, has poor memory.  Symptoms have worsened the past few days.  Patient reports following associated symptoms:  tongue still hurts, looks white around area that was bit, swollen gland and ear hurts. Gland is better this am.  Still eating and drinking but uncomfortable.  Pain is 7/10.  Denies fever, chills, headache, breathing problems.    Treating symptoms with: Ice, cold drinks, last took ibuprofen yesterday.       Has secondary complaint of dizziness and palpitations.  Pt denies any acute syncopal episodes.  Patient symptoms are intermittent throughout the day and been occuring more frequently for the past 2 weeks.   States has had palpitations for many years but never addressed. Symptoms are precipitated by: none identified.  Patient reports faintness/lightheadedness.  Rest makes symptoms better; changing position makes symptoms worse.  Associated symptoms:  mild chest pressure.  Denies pain, SOB, headache, light-sensitivity, neck pain or stiffness, tinnitus, difficulty with speech, head injury, or abnormal motor function.  No history of cardiac/vascular disease.  +hx of anxiety/panic disorder/depression.  Stable on meds for over 5 yrs.        Current Medications[1]   Past Medical History[2]   Social History:  Short Social Hx on File[3]     REVIEW OF SYSTEMS:   GENERAL HEALTH:  See HPI  SKIN: denies any unusual skin lesions or rashes  HEENT: See HPI. No diminished hearing, no aural fullness, no  tinnitus.  RESPIRATORY: denies shortness of breath, or wheezing  CARDIOVASCULAR: denies chest pain, + palpitations   GI: denies abdominal pain, vomiting, constipation and diarrhea. adequate appetite  NEURO:  See HPI. No seizures, no confusion, no weakness, no abnormal sensation      EXAM:   BP 98/60   Pulse 50   Temp 97.5 °F (36.4 °C) (Tympanic)   Resp 18   Ht 5' 4\" (1.626 m)   Wt 194 lb (88 kg)   LMP 04/29/2025 (Approximate)   SpO2 96%   BMI 33.30 kg/m²     Physical Exam  Vitals reviewed.   Constitutional:       General: She is not in acute distress.     Appearance: Normal appearance. She is not ill-appearing.   HENT:      Head: Normocephalic and atraumatic.      Right Ear: Tympanic membrane and ear canal normal.      Left Ear: Tympanic membrane and ear canal normal.      Nose: Nose normal.      Mouth/Throat:      Lips: Pink.      Mouth: Mucous membranes are moist.      Tongue: Lesions (tender 5mm aphthous ulcer right lateral boarder.) present.      Pharynx: Oropharynx is clear. Uvula midline. No posterior oropharyngeal erythema.      Tonsils: No tonsillar exudate.     Eyes:      Extraocular Movements: Extraocular movements intact.      Conjunctiva/sclera: Conjunctivae normal.   Cardiovascular:      Rate and Rhythm: Regular rhythm. Bradycardia present.      Pulses:           Radial pulses are 2+ on the right side and 2+ on the left side.        Posterior tibial pulses are 2+ on the right side and 2+ on the left side.      Heart sounds: Normal heart sounds. No murmur heard.  Pulmonary:      Effort: Pulmonary effort is normal.      Breath sounds: Normal breath sounds and air entry.   Musculoskeletal:      Cervical back: Normal range of motion and neck supple.      Right lower leg: No edema.      Left lower leg: No edema.   Lymphadenopathy:      Head:      Right side of head: No submental, submandibular, preauricular or posterior auricular adenopathy.      Left side of head: No submental, submandibular, preauricular or posterior auricular adenopathy.      Cervical: Cervical adenopathy present.      Right cervical: Superficial cervical adenopathy present. No posterior cervical adenopathy.     Left cervical: No  superficial or posterior cervical adenopathy.   Skin:     General: Skin is warm and dry.      Findings: No rash.   Neurological:      Mental Status: She is alert and oriented to person, place, and time.      Cranial Nerves: Cranial nerves 2-12 are intact.      Sensory: Sensation is intact.      Motor: Motor function is intact.      Coordination: Coordination is intact. Romberg sign negative.      Gait: Tandem walk abnormal (slightly unsteady). Gait normal.      Deep Tendon Reflexes: Reflexes are normal and symmetric.   Psychiatric:         Mood and Affect: Mood is anxious.         Behavior: Behavior is cooperative.         No results found for this or any previous visit (from the past 24 hours).    ASSESSMENT AND PLAN:   ASSESSMENT:  Encounter Diagnoses   Name Primary?    Canker sore Yes    Dizziness     Palpitations     Orthostatic hypotension        PLAN:   Discussed self limiting oral condition.  Reviewed comfort measures.  Will consider oral antibiotics if symptoms fail to improve with conservative measures.  Comfort care as listed in patient instructions.   Medication as below.    Requested Prescriptions     Signed Prescriptions Disp Refills    Lidocaine Viscous HCl 2 % Mouth/Throat Solution 100 mL 0     Sig: Take 5 mL by mouth as needed for Pain (May gargle with every 4 hours if needed).       Risks, benefits, complications and side effects of meds discussed with patient.     Follow up in 3-5 days if not improving, condition worsens, or fever greater than or equal to 100.4 persists for 72 hours.  Discussed going to IC or ER for concerning symptoms.  However, appointment made for today 5/6/25 @ [id number] with Dr. Arguelles to address concerns.  The patient/parent indicates understanding of these issues and agrees to the plan.    Patient Instructions       Comfort measures explained and discussed:    OTC Tylenol/ibuprofen as needed.    Push fluids- warm or cool liquids, whichever is soothing for patient.     Avoid  caffeine.    Do not share utensils or drinks with anyone.    Good handwashing.    Get plenty of rest.    Warm salt water gargles 2-3 times daily for at least 3 days.           [1]   Current Outpatient Medications   Medication Sig Dispense Refill    Lidocaine Viscous HCl 2 % Mouth/Throat Solution Take 5 mL by mouth as needed for Pain (May gargle with every 4 hours if needed). 100 mL 0    escitalopram 20 MG Oral Tab Take 1 tablet (20 mg total) by mouth every morning. 90 tablet 3    QUEtiapine 50 MG Oral Tab Take 1 tablet (50 mg total) by mouth 2 (two) times daily. 90 tablet 3    clonazePAM 1 MG Oral Tab Take 1 tablet (1 mg total) by mouth 2 (two) times daily as needed for Anxiety. 1.5 tablet in morning 1.5 tablet in evening. 90 tablet 0    Multiple Vitamin (MULTI VITAMIN DAILY OR) Take by mouth.     [2]   Past Medical History:   Anxiety    Depression    Panic attacks    PTSD (post-traumatic stress disorder)   [3]   Social History  Socioeconomic History    Marital status: Single   Tobacco Use    Smoking status: Former     Current packs/day: 0.25     Types: Cigarettes    Smokeless tobacco: Current    Tobacco comments:     2 cig a day    Vaping Use    Vaping status: Every Day    Substances: Nicotine, Flavoring    Devices: Disposable   Substance and Sexual Activity    Alcohol use: No    Drug use: No   Other Topics Concern    Caffeine Concern Yes     Comment: Energy drinks: occasionally    Exercise Yes   Social History Narrative    The patient does not use an assistive device..      The patient does live in a home with stairs.     Social Drivers of Health     Food Insecurity: Food Insecurity Present (2/4/2025)    NCSS - Food Insecurity     Worried About Running Out of Food in the Last Year: Yes     Ran Out of Food in the Last Year: Yes   Transportation Needs: No Transportation Needs (2/4/2025)    NCSS - Transportation     Lack of Transportation: No   Housing Stability: At Risk (2/4/2025)    NCSS - Housing/Utilities     Has  Housing: No     Worried About Losing Housing: Yes     Unable to Get Utilities: Yes

## 2025-05-06 NOTE — PROGRESS NOTES
OFFICE NOTE       The following individual(s) verbally consented to be recorded using ambient AI listening technology and understand that they can each withdraw their consent to this listening technology at any point by asking the clinician to turn off or pause the recording:    Patient name: Neelam Kuhn  Additional names:            Patient ID: Neelam Kuhn is a 35 year old female.  Today's Date: 05/06/25  Chief Complaint: Hypotension (Starts to feel dizzy when getting up to walk) and Chest Pressure (At night when laying down)      History of Present Illness  Ms. Neelam Kuhn is a 35 year old female who presents with hypotension and chest discomfort. She is accompanied by her .    She has been experiencing hypotension and chest discomfort for the past week. Her blood pressure remains low throughout the day and night, unaffected by positional changes. She also experiences heart palpitations, describing her heart as 'stressing to beat,' which can be painful. These symptoms worsen with certain positions, necessitating frequent posture adjustments.    She has a history of palpitations throughout her life, which her mother, a nurse, would manage by checking her pulse and advising her to sit until the palpitations subsided. However, she notes that the current episode is more severe than previous ones.    Her current medications include Seroquel 50 mg at night, citalopram 20 mg in the morning, and clonazepam 2 mg as needed. She also takes a multivitamin. There are no new medications or supplements.    In terms of social history, she is a former smoker who quit cigarettes two years ago but currently vapes. She does not consume alcohol or use recreational drugs. She is  and sexually active with a male partner.    Her family history includes circulatory issues in her maternal grandmother, but she is unsure about other family members' medical histories.    She denies any recent exposure  to ticks or Lyme disease risk factors and reports seasonal allergies.       Vitals:    05/06/25 1421   BP: 100/68   Pulse: (!) 42   SpO2: 98%   Weight: 195 lb (88.5 kg)   Height: 5' 4\" (1.626 m)     body mass index is 33.47 kg/m².  BP Readings from Last 3 Encounters:   05/06/25 100/68   05/06/25 98/60   02/04/25 116/74     The ASCVD Risk score (Maryellen REDDY, et al., 2019) failed to calculate for the following reasons:    The 2019 ASCVD risk score is only valid for ages 40 to 79  Results  DIAGNOSTIC  EKG: Normal (05/06/2025)       Medications reviewed:  Current Medications[1]      Assessment & Plan    1. Annual physical exam (Primary)  -     Lipid Panel; Future; Expected date: 05/06/2025  -     TSH W Reflex To Free T4; Future; Expected date: 05/06/2025  -     Hemoglobin A1C; Future; Expected date: 05/06/2025  -     Comp Metabolic Panel (14); Future; Expected date: 05/06/2025  -     CBC With Differential With Platelet; Future; Expected date: 05/06/2025  -     Vitamin D; Future; Expected date: 05/06/2025  2. Near syncope  -     EKG 12 Lead; Future; Expected date: 05/06/2025  -     CARD ZIO EXTENDED MONITOR 8-14 DAYS (CPT=93246/18443); Future; Expected date: 05/06/2025  -     CARD ECHO 2D DOPPLER (CPT=93306); Future; Expected date: 05/06/2025  -     [facility] CARDIOLOGY EXTERNAL  -     Lyme Disease, Total Ab W Rflx; Future; Expected date: 05/06/2025  3. Palpitations  -     EKG 12 Lead; Future; Expected date: 05/06/2025  -     CARD ZIO EXTENDED MONITOR 8-14 DAYS (CPT=93246/12345); Future; Expected date: 05/06/2025  -     CARD ECHO 2D DOPPLER (CPT=93306); Future; Expected date: 05/06/2025  -     [facility] CARDIOLOGY EXTERNAL  -     D-Dimer; Future; Expected date: 05/06/2025  -     Troponin I (High Sensitivity); Future; Expected date: 05/06/2025  -     Lyme Disease, Total Ab W Rflx; Future; Expected date: 05/06/2025  4. Vitamin D deficiency  -     Vitamin D; Future; Expected date: 05/06/2025  5. Screening  for cervical cancer  -     [facility] Referral - In Network    Assessment & Plan  Hypotension and chest discomfort  Intermittent hypotension and chest discomfort, possibly due to cardiac arrhythmia, structural heart issues, or valvular abnormalities. Consideration of Lyme disease as a differential diagnosis.  - Order EKG to assess cardiac electrical activity.  - Order Holter monitor for up to two weeks to capture any irregular cardiac events.  - Order transthoracic echocardiogram to evaluate cardiac structure and function.  - Refer to cardiologist for further evaluation and management.  - Order blood work including thyroid function, diabetes screening, cholesterol levels, electrolytes, kidney and liver function tests, vitamin D, D-dimer, and troponin levels.  - Order Lyme disease serology to rule out Lyme disease as a cause of bradycardia.    Recurrent sepsis  Recurrent sepsis with lifestyle modifications implemented post-recovery.    Gynecological concerns  Symptoms suggestive of early menopause with a family history of severe gynecological issues.  - Refer to gynecologist for evaluation and management of gynecological concerns, including potential early menopause.       Follow Up: As needed/if symptoms worsen or No follow-ups on file..     Objective/ Results:   Physical Exam  Constitutional:       Appearance: She is well-developed.   Cardiovascular:      Rate and Rhythm: Normal rate and regular rhythm.      Heart sounds: Normal heart sounds.   Pulmonary:      Effort: Pulmonary effort is normal.      Breath sounds: Normal breath sounds.   Abdominal:      General: Bowel sounds are normal.      Palpations: Abdomen is soft.   Skin:     General: Skin is warm and dry.   Neurological:      Mental Status: She is alert and oriented to person, place, and time.      Deep Tendon Reflexes: Reflexes are normal and symmetric.        Physical Exam  CHEST: Lungs sound normal.  CARDIOVASCULAR: Heart sounds normal, no abnormal murmurs.      Reviewed:    Patient Active Problem List    Diagnosis    Jaw swelling    Dysuria    Cigarette nicotine dependence without complication    Moderate episode of recurrent major depressive disorder (HCC)    Anxiety    Frequent urinary tract infections      Allergies[2]   Short Social Hx on File[3]   Review of Systems   Constitutional:  Positive for fatigue.   Respiratory:  Positive for chest tightness.    Cardiovascular:  Positive for chest pain.   Gastrointestinal: Negative.    Skin: Negative.    Neurological: Negative.        All other systems negative unless otherwise stated in ROS or HPI above.       Jesse Arguelles MD  Internal Medicine       Call office with any questions or seek emergency care if necessary.   Patient understands and agrees to follow directions and advice.      ----------------------------------------- PATIENT INSTRUCTIONS-----------------------------------------     There are no Patient Instructions on file for this visit.        The 21st Century Cures Act makes medical notes available to patients in the interest of transparency.  However, please be advised that this is a medical document.  It is intended as a peer to peer communication.  It is written in medical language and may contain abbreviations or verbiage that are technical and unfamiliar.  It may appear blunt or direct.  Medical documents are intended to carry relevant information, facts as evident, and the clinical opinion of the practitioner.          [1]   Current Outpatient Medications   Medication Sig Dispense Refill    escitalopram 20 MG Oral Tab Take 1 tablet (20 mg total) by mouth every morning. 90 tablet 3    QUEtiapine 50 MG Oral Tab Take 1 tablet (50 mg total) by mouth 2 (two) times daily. (Patient taking differently: Take 1 tablet (50 mg total) by mouth in the evening and 1 tablet (50 mg total) before bedtime. Two at night time.) 90 tablet 3    clonazePAM 1 MG Oral Tab Take 1 tablet (1 mg total) by mouth 2 (two) times daily as  needed for Anxiety. 1.5 tablet in morning 1.5 tablet in evening. 90 tablet 0    Multiple Vitamin (MULTI VITAMIN DAILY OR) Take by mouth.      Lidocaine Viscous HCl 2 % Mouth/Throat Solution Take 5 mL by mouth as needed for Pain (May gargle with every 4 hours if needed). 100 mL 0   [2]   Allergies  Allergen Reactions    Seasonal OTHER (SEE COMMENTS)     Eye swelling, congestion, cough   [3]   Social History  Socioeconomic History    Marital status: Single   Tobacco Use    Smoking status: Former     Current packs/day: 0.00     Average packs/day: 0.3 packs/day for 20.0 years (5.0 ttl pk-yrs)     Types: Cigarettes     Start date:      Quit date:      Years since quittin.3     Passive exposure: Past    Smokeless tobacco: Former    Tobacco comments:     2 cig a day    Vaping Use    Vaping status: Former    Substances: Nicotine, Flavoring    Devices: Disposable   Substance and Sexual Activity    Alcohol use: No    Drug use: No    Sexual activity: Yes     Partners: Male   Other Topics Concern    Caffeine Concern Yes     Comment: Energy drinks: occasionally    Exercise Yes   Social History Narrative    The patient does not use an assistive device..      The patient does live in a home with stairs.     Social Drivers of Health     Food Insecurity: Food Insecurity Present (2025)    NCSS - Food Insecurity     Worried About Running Out of Food in the Last Year: Yes     Ran Out of Food in the Last Year: Yes   Transportation Needs: No Transportation Needs (2025)    NCSS - Transportation     Lack of Transportation: No   Housing Stability: At Risk (2025)    NCSS - Housing/Utilities     Has Housing: No     Worried About Losing Housing: Yes     Unable to Get Utilities: Yes

## 2025-05-09 LAB
ATRIAL RATE: 33 BPM
P AXIS: 22 DEGREES
P-R INTERVAL: 142 MS
Q-T INTERVAL: 604 MS
QRS DURATION: 84 MS
QTC CALCULATION (BEZET): 446 MS
R AXIS: 74 DEGREES
T AXIS: 52 DEGREES
VENTRICULAR RATE: 33 BPM

## 2025-05-09 NOTE — PROGRESS NOTES
Please relay to patient if not read:     Christiana Ms Kuhn,   Your heart rate is very low, if you are still experiencing symptoms please go to nearest ER. Otherwise please follow up with cardiologist for further evaluation  -DR. Arguelles

## (undated) NOTE — ED AVS SNAPSHOT
Ms. Kamari Joseph   MRN: [id number]    Department:  [facility] Emergency Department   Date of Visit:  5/24/2018           Disclosure     Insurance plans vary and the physician(s) referred by the ER may not be covered by your plan.  Please con CARE PHYSICIAN AT ONCE OR RETURN IMMEDIATELY TO THE EMERGENCY DEPARTMENT. If you have been prescribed any medication(s), please fill your prescription right away and begin taking the medication(s) as directed.   If you believe that any of the medications

## (undated) NOTE — ED AVS SNAPSHOT
Ms. Virgil Cintron   MRN: [id number]    Department:  [facility] Emergency Department   Date of Visit:  3/30/2019           Disclosure     Insurance plans vary and the physician(s) referred by the ER may not be covered by your plan.  Please con CARE PHYSICIAN AT ONCE OR RETURN IMMEDIATELY TO THE EMERGENCY DEPARTMENT. If you have been prescribed any medication(s), please fill your prescription right away and begin taking the medication(s) as directed.   If you believe that any of the medications

## (undated) NOTE — LETTER
1/23/2021          To Whom It May Concern:    Margarito Jimenez is currently under my medical care and may not return to work at this time. Please excuse Neelam for 3 days. She may return to work on 1/27/2021.   Activity is restricted as follows: none

## (undated) NOTE — LETTER
Date & Time: 4/3/2019, 8:09 AM  Patient: Sajan Cagle           Dear Sajan Cagle,          After numerous attempts, we have been unable to contact you via telephone.  If you have any questions or need additional information please contact the [facility]

## (undated) NOTE — ED AVS SNAPSHOT
Ms. Nevaeh Marques   MRN: [id number]    Department:  [facility] Emergency Department   Date of Visit:  9/26/2019           Disclosure     Insurance plans vary and the physician(s) referred by the ER may not be covered by your plan.  Please con CARE PHYSICIAN AT ONCE OR RETURN IMMEDIATELY TO THE EMERGENCY DEPARTMENT. If you have been prescribed any medication(s), please fill your prescription right away and begin taking the medication(s) as directed.   If you believe that any of the medications

## (undated) NOTE — ED AVS SNAPSHOT
Ms. Salina Vinecnt   MRN: [id number]    Department:  [facility] Emergency Department   Date of Visit:  3/2/2018           Disclosure     Insurance plans vary and the physician(s) referred by the ER may not be covered by your plan.  Please cont CARE PHYSICIAN AT ONCE OR RETURN IMMEDIATELY TO THE EMERGENCY DEPARTMENT. If you have been prescribed any medication(s), please fill your prescription right away and begin taking the medication(s) as directed.   If you believe that any of the medications

## (undated) NOTE — LETTER
9/13/2019              Neelam Kuhn        [address] [address]         To Whom It May Concern,      Sourav Silva is pregnant. Please call if you have any questions.       Sincerely,        Dre Betancourt MD  [facility]

## (undated) NOTE — ED AVS SNAPSHOT
Ms. Marge Moe   MRN: [id number]    Department:  [facility] Emergency Department   Date of Visit:  10/1/2019           Disclosure     Insurance plans vary and the physician(s) referred by the ER may not be covered by your plan.  Please con CARE PHYSICIAN AT ONCE OR RETURN IMMEDIATELY TO THE EMERGENCY DEPARTMENT. If you have been prescribed any medication(s), please fill your prescription right away and begin taking the medication(s) as directed.   If you believe that any of the medications

## (undated) NOTE — LETTER
7/26/2019              Neelam Kee         Dear Lance Smith,    This letter is to inform you that our office has made several attempts to reach you by phone without success.   We were attempting to con